# Patient Record
Sex: FEMALE | Race: WHITE | NOT HISPANIC OR LATINO | Employment: FULL TIME | URBAN - METROPOLITAN AREA
[De-identification: names, ages, dates, MRNs, and addresses within clinical notes are randomized per-mention and may not be internally consistent; named-entity substitution may affect disease eponyms.]

---

## 2021-02-17 ENCOUNTER — CONSULT (OUTPATIENT)
Dept: SURGERY | Facility: CLINIC | Age: 41
End: 2021-02-17
Payer: COMMERCIAL

## 2021-02-17 VITALS
BODY MASS INDEX: 35.16 KG/M2 | DIASTOLIC BLOOD PRESSURE: 84 MMHG | HEART RATE: 80 BPM | WEIGHT: 211 LBS | HEIGHT: 65 IN | SYSTOLIC BLOOD PRESSURE: 138 MMHG | TEMPERATURE: 98.8 F

## 2021-02-17 DIAGNOSIS — T81.89XA SUTURE GRANULOMA: Primary | ICD-10-CM

## 2021-02-17 PROCEDURE — 99211 OFF/OP EST MAY X REQ PHY/QHP: CPT | Performed by: SPECIALIST

## 2021-02-17 RX ORDER — BUTALBITAL, ACETAMINOPHEN AND CAFFEINE 50; 325; 40 MG/1; MG/1; MG/1
1 TABLET ORAL EVERY 4 HOURS PRN
COMMUNITY
End: 2022-03-02 | Stop reason: HOSPADM

## 2021-02-17 NOTE — LETTER
February 17, 2021     [address]  [address] [facility]  [place] [address] [address]  [address]    Patient: Emy Marr   YOB: 1980   Date of Visit: 2/17/2021       Dear  Angel Keenan you for referring Emy Marr to me for evaluation  Below are my notes for this consultation  If you have questions, please do not hesitate to call me  I look forward to following your patient along with you  Sincerely,     Olla Mcardle, MD        CC: No Recipients  Reed Weaver MD  2/17/2021  5:43 PM  Sign when Signing Visit  Chief Complaint:  Abdominal pain      History of Present Illness:  Patient is a 59-year-old white female from [facility] who underwent an open vertical banded gastroplasty in the early 2000s for morbid obesity  She developed an incisional hernia and subsequent underwent a laparoscopic repair of her incisional hernia with mesh  She lost a significant amount of weight and developed a large abdominal pannus  She was recently seen in the office of Dr Ana Bob plastic surgeon a [facility] for evaluation for skin removal surgery  Over the past several months she has developed fairly significant left paramedian pain  This was related primarily with movement, physical activity etcetera  There was no GI component to this  She was seen in consultation by her previous surgeons and this was obviously felt to be related to her hernia repair  She had imaging studies that did not demonstrate any abnormalities  She apparently spent a suture from the left side of her abdomen through 1 of the port sites and this was removed in the office by her surgeon  Be that as it may she was seen by Dr Robyn Vail and referred to our office for evaluation in regards to this abdominal pain  Past Medical History: History reviewed  No pertinent past medical history  Morbid obesity      Past Surgical History:  History reviewed  No pertinent surgical history     vertical banded gastroplasty, laparoscopic incisional hernia repair    Allergies:  No Known Allergies      Medications:    Current Outpatient Medications:     butalbital-acetaminophen-caffeine (FIORICET,ESGIC) -40 mg per tablet, Take 1 tablet by mouth every 4 (four) hours as needed for headaches, Disp: , Rfl:       Social History:  Social History     Social History     Substance and Sexual Activity   Alcohol Use Yes    Alcohol/week: 1 0 standard drinks    Types: 1 Glasses of wine per week    Frequency: Monthly or less    Drinks per session: 1 or 2    Binge frequency: Less than monthly     Social History     Substance and Sexual Activity   Drug Use Never     Social History     Tobacco Use   Smoking Status Never Smoker   Smokeless Tobacco Never Used         Family History:  History reviewed  No pertinent family history  Review of Systems:     as per the HPI left-sided abdominal wall pain with exertion physical activity sneezing coughing etcetera  Weight loss as per the HPI she denies fever chills night sweats chest pain nausea vomiting diarrhea constipation shortness of breath headaches blurry vision cough sore throat dysuria hematuria etcetera all other review of systems are negative    Vitals:  Vitals:    02/17/21 1037   BP: 138/84   Pulse: 80   Temp: 98 8 °F (37 1 °C)       Physical Exam:   the patient is a middle-aged white female who is awake alert no distress  She is 5 ft 5 in 211 lb with BMI of 35  Vital signs as above  Skin warm dry  Head normocephalic and atraumatic   Eyes DAPHNIE a  EOM intact  Ears nose within normal limits  Throat gag reflex intact   Neck no masses thyromegaly lymphadenopathy palpable  Back no CVA or spinal tenderness   Lungs clear to a and P   Cor regular rate and rhythm no murmurs carotid bruits   Abdomen soft obese with large pannus noted  Midline incision well healed upper midline  No evidence recurrent hernia  Laparoscopic incisions    She has some tenderness to deep palpation primarily in the left paramedian area  No masses or hernias are noted  Extremities negative CC E   Neurologically A&O x3 cranial nerves 2-12 intact   Lymphatics no lymphadenopathy palpable      Lab Results: I have personally reviewed pertinent reports  See below  Imaging: I have personally reviewed pertinent reports  EKG, Pathology, and Other Studies: I have personally reviewed pertinent reports  No visits with results within 1 Day(s) from this visit  Latest known visit with results is:   No results found for any previous visit  Impression:   left paramedian abdominal wall pain  Her previous surgeon felt that perhaps the tacks that were used to fix her incisional hernia laparoscopically may be the etiology of this  The surgeon said it would be like looking for needle in a haystack  The hernia repair took place around 2016 2017  Most of the tacks used at that time were absorbable tacks and not titanium  In reviewing the imaging studies no comment was made if tacks were visible  In reviewing the operative report of the laparoscopic hernia repair the term tack was used but not whether they are absorbable  Or permanent  Incidentally sutures were placed to fix the mesh transabdominally and trans fascial E  These could be the culprits  3 approaches were discussed with the patient and her significant other  1  The  Area in question could be injected with Marcaine and steroids to see if that would help ease the situation  If this afforded significant long-term relief than the problem solved  2  Cutting down on the area and trying to remove what might be causing the problem 3  If permanent tacks were used that perhaps a laparoscopic approach might be indicated  In an attempt to remove them     Another approach would be to be present for the abdominal plasty and then inspect the abdominal wall and fascia at that time    If sutures would be present these could be removed at that time hopefully solving the problem  Plan: At this point will attempt to get the CD of the CT scan to see if any tacks were visible since none of this was mentioned on any of the imaging reports  Be available for the procedure to see if any suture granulomas could be removed at the time of surgery  If no permanent tacks were involved then it would have to be the sutures causing the problem

## 2021-02-17 NOTE — PROGRESS NOTES
Chief Complaint:  Abdominal pain      History of Present Illness:  Patient is a 77-year-old [facility] who underwent an open vertical banded gastroplasty in the early 2000s for morbid obesity  She developed an incisional hernia and subsequent underwent a laparoscopic repair of her incisional hernia with mesh  She lost a significant amount of weight and developed a large abdominal pannus  She was recently seen in the office of Dr Estevan Johnston plastic surgeon a [facility] for evaluation for skin removal surgery  Over the past several months she has developed fairly significant left paramedian pain  This was related primarily with movement, physical activity etcetera  There was no GI component to this  She was seen in consultation by her previous surgeons and this was obviously felt to be related to her hernia repair  She had imaging studies that did not demonstrate any abnormalities  She apparently spent a suture from the left side of her abdomen through 1 of the port sites and this was removed in the office by her surgeon  Be that as it may she was seen by Dr Tonia Pierce and referred to our office for evaluation in regards to this abdominal pain  Past Medical History: History reviewed  No pertinent past medical history  Morbid obesity      Past Surgical History:  History reviewed  No pertinent surgical history     vertical banded gastroplasty, laparoscopic incisional hernia repair    Allergies:  No Known Allergies      Medications:    Current Outpatient Medications:     butalbital-acetaminophen-caffeine (FIORICET,ESGIC) -40 mg per tablet, Take 1 tablet by mouth every 4 (four) hours as needed for headaches, Disp: , Rfl:       Social History:  Social History     Social History     Substance and Sexual Activity   Alcohol Use Yes    Alcohol/week: 1 0 standard drinks    Types: 1 Glasses of wine per week    Frequency: Monthly or less    Drinks per session: 1 or 2  Binge frequency: Less than monthly     Social History     Substance and Sexual Activity   Drug Use Never     Social History     Tobacco Use   Smoking Status Never Smoker   Smokeless Tobacco Never Used         Family History:  History reviewed  No pertinent family history  Review of Systems:     as per the HPI left-sided abdominal wall pain with exertion physical activity sneezing coughing etcetera  Weight loss as per the HPI she denies fever chills night sweats chest pain nausea vomiting diarrhea constipation shortness of breath headaches blurry vision cough sore throat dysuria hematuria etcetera all other review of systems are negative    Vitals:  Vitals:    02/17/21 1037   BP: 138/84   Pulse: 80   Temp: 98 8 °F (37 1 °C)       Physical Exam:   the patient is a middle-aged white female who is awake alert no distress  She is 5 ft 5 in 211 lb with BMI of 35  Vital signs as above  Skin warm dry  Head normocephalic and atraumatic   Eyes DAPHNIE a  EOM intact  Ears nose within normal limits  Throat gag reflex intact   Neck no masses thyromegaly lymphadenopathy palpable  Back no CVA or spinal tenderness   Lungs clear to a and P   Cor regular rate and rhythm no murmurs carotid bruits   Abdomen soft obese with large pannus noted  Midline incision well healed upper midline  No evidence recurrent hernia  Laparoscopic incisions  She has some tenderness to deep palpation primarily in the left paramedian area  No masses or hernias are noted  Extremities negative CC E   Neurologically A&O x3 cranial nerves 2-12 intact   Lymphatics no lymphadenopathy palpable      Lab Results: I have personally reviewed pertinent reports  See below  Imaging: I have personally reviewed pertinent reports  EKG, Pathology, and Other Studies: I have personally reviewed pertinent reports  No visits with results within 1 Day(s) from this visit  Latest known visit with results is:   No results found for any previous visit  Impression:   left paramedian abdominal wall pain  Her previous surgeon felt that perhaps the tacks that were used to fix her incisional hernia laparoscopically may be the etiology of this  The surgeon said it would be like looking for needle in a haystack  The hernia repair took place around 2016 2017  Most of the tacks used at that time were absorbable tacks and not titanium  In reviewing the imaging studies no comment was made if tacks were visible  In reviewing the operative report of the laparoscopic hernia repair the term tack was used but not whether they are absorbable  Or permanent  Incidentally sutures were placed to fix the mesh transabdominally and trans fascial E  These could be the culprits  3 approaches were discussed with the patient and her significant other  1  The  Area in question could be injected with Marcaine and steroids to see if that would help ease the situation  If this afforded significant long-term relief than the problem solved  2  Cutting down on the area and trying to remove what might be causing the problem 3  If permanent tacks were used that perhaps a laparoscopic approach might be indicated  In an attempt to remove them     Another approach would be to be present for the abdominal plasty and then inspect the abdominal wall and fascia at that time  If sutures would be present these could be removed at that time hopefully solving the problem  Plan: At this point will attempt to get the CD of the CT scan to see if any tacks were visible since none of this was mentioned on any of the imaging reports  Be available for the procedure to see if any suture granulomas could be removed at the time of surgery  If no permanent tacks were involved then it would have to be the sutures causing the problem

## 2021-02-23 ENCOUNTER — TELEPHONE (OUTPATIENT)
Dept: SURGERY | Facility: CLINIC | Age: 41
End: 2021-02-23

## 2021-05-12 ENCOUNTER — APPOINTMENT (OUTPATIENT)
Dept: RADIOLOGY | Facility: CLINIC | Age: 41
End: 2021-05-12
Payer: COMMERCIAL

## 2021-05-12 ENCOUNTER — OFFICE VISIT (OUTPATIENT)
Dept: URGENT CARE | Facility: CLINIC | Age: 41
End: 2021-05-12
Payer: COMMERCIAL

## 2021-05-12 VITALS
SYSTOLIC BLOOD PRESSURE: 133 MMHG | BODY MASS INDEX: 35.16 KG/M2 | RESPIRATION RATE: 18 BRPM | WEIGHT: 211 LBS | TEMPERATURE: 98 F | HEART RATE: 89 BPM | OXYGEN SATURATION: 98 % | DIASTOLIC BLOOD PRESSURE: 63 MMHG | HEIGHT: 65 IN

## 2021-05-12 DIAGNOSIS — S52.502A CLOSED FRACTURE OF DISTAL END OF LEFT RADIUS, UNSPECIFIED FRACTURE MORPHOLOGY, INITIAL ENCOUNTER: Primary | ICD-10-CM

## 2021-05-12 DIAGNOSIS — S69.92XA INJURY OF LEFT WRIST, INITIAL ENCOUNTER: ICD-10-CM

## 2021-05-12 PROCEDURE — 99213 OFFICE O/P EST LOW 20 MIN: CPT | Performed by: PHYSICIAN ASSISTANT

## 2021-05-12 PROCEDURE — 73110 X-RAY EXAM OF WRIST: CPT

## 2021-05-12 NOTE — PROGRESS NOTES
[facility]    NAME: Isra Loredo is a 39 y o  female  : 1980    MRN: [id number]  DATE: May 12, 2021  TIME: 7:19 PM    Assessment and Plan   Closed fracture of distal end of left radius, unspecified fracture morphology, initial encounter [S52 502A]  1  Closed fracture of distal end of left radius, unspecified fracture morphology, initial encounter  Ambulatory referral to Orthopedic Surgery   2  Injury of left wrist, initial encounter  XR wrist 3+ vw left       Patient Instructions   Patient Instructions   Wear brace  Follow up with ortho  Ice, elevation  Chief Complaint     Chief Complaint   Patient presents with    Wrist Pain     left wrist pain after an 80 lb bag of concrete mix fell onto her wrist 2 days ago       History of Present Illness     41-year-old female here with complaint of left wrist pain  Patient was moving 80 pound bags of concrete and a bag fell onto her wrist   This occurred 3 days ago  Wrist is still swollen and painful  Review of Systems   Review of Systems   Constitutional: Negative for chills and fever  Respiratory: Negative for cough and shortness of breath  Cardiovascular: Negative for chest pain     Musculoskeletal:        Left wrist injury, see HPI       Current Medications     Current Outpatient Medications:     butalbital-acetaminophen-caffeine (FIORICET,ESGIC) -40 mg per tablet, Take 1 tablet by mouth every 4 (four) hours as needed for headaches, Disp: , Rfl:     levonorgestrel (Mirena, 52 MG,) 20 MCG/24HR IUD, , Disp: , Rfl:     Current Allergies     Allergies as of 2021 - Reviewed 2021   Allergen Reaction Noted    Fentanyl Itching 2020    Hydromorphone Rash 2020    Morphine Itching 2020          The following portions of the patient's history were reviewed and updated as appropriate: allergies, current medications, past family history, past medical history, past social history, past surgical history and problem list    History reviewed  No pertinent past medical history  History reviewed  No pertinent surgical history  History reviewed  No pertinent family history  Social History     Socioeconomic History    Marital status:      Spouse name: Not on file    Number of children: Not on file    Years of education: Not on file    Highest education level: Not on file   Occupational History    Not on file   Social Needs    Financial resource strain: Not on file    Food insecurity     Worry: Not on file     Inability: Not on file    Transportation needs     Medical: Not on file     Non-medical: Not on file   Tobacco Use    Smoking status: Never Smoker    Smokeless tobacco: Never Used   Substance and Sexual Activity    Alcohol use: Yes     Alcohol/week: 1 0 standard drinks     Types: 1 Glasses of wine per week     Frequency: Monthly or less     Drinks per session: 1 or 2     Binge frequency: Less than monthly    Drug use: Never    Sexual activity: Yes     Partners: Male   Lifestyle    Physical activity     Days per week: Not on file     Minutes per session: Not on file    Stress: Not on file   Relationships    Social connections     Talks on phone: Not on file     Gets together: Not on file     Attends Synagogue service: Not on file     Active member of club or organization: Not on file     Attends meetings of clubs or organizations: Not on file     Relationship status: Not on file    Intimate partner violence     Fear of current or ex partner: Not on file     Emotionally abused: Not on file     Physically abused: Not on file     Forced sexual activity: Not on file   Other Topics Concern    Not on file   Social History Narrative    Not on file     Medications have been verified      Objective   /63   Pulse 89   Temp 98 °F (36 7 °C) (Temporal)   Resp 18   Ht 5' 5" (1 651 m)   Wt 95 7 kg (211 lb)   SpO2 98%   BMI 35 11 kg/m²      Physical Exam   Physical Exam  Vitals signs and nursing note reviewed  Constitutional:       Appearance: Normal appearance  Neck:      Musculoskeletal: Normal range of motion  Cardiovascular:      Rate and Rhythm: Normal rate and regular rhythm  Pulses: Normal pulses  Heart sounds: Normal heart sounds  Pulmonary:      Effort: Pulmonary effort is normal       Breath sounds: Normal breath sounds  Musculoskeletal:      Left wrist: She exhibits decreased range of motion, tenderness, bony tenderness and swelling  Neurological:      Mental Status: She is alert

## 2021-05-21 ENCOUNTER — OFFICE VISIT (OUTPATIENT)
Dept: OBGYN CLINIC | Facility: CLINIC | Age: 41
End: 2021-05-21
Payer: COMMERCIAL

## 2021-05-21 VITALS
HEIGHT: 65 IN | HEART RATE: 88 BPM | SYSTOLIC BLOOD PRESSURE: 132 MMHG | DIASTOLIC BLOOD PRESSURE: 84 MMHG | WEIGHT: 209 LBS | BODY MASS INDEX: 34.82 KG/M2

## 2021-05-21 DIAGNOSIS — S69.92XA WRIST INJURY, LEFT, INITIAL ENCOUNTER: Primary | ICD-10-CM

## 2021-05-21 DIAGNOSIS — S60.212A CONTUSION OF LEFT WRIST, INITIAL ENCOUNTER: ICD-10-CM

## 2021-05-21 PROCEDURE — 99204 OFFICE O/P NEW MOD 45 MIN: CPT | Performed by: FAMILY MEDICINE

## 2021-05-21 RX ORDER — MELOXICAM 7.5 MG/1
TABLET ORAL
COMMUNITY
Start: 2021-05-18 | End: 2022-03-02 | Stop reason: HOSPADM

## 2021-05-21 NOTE — PROGRESS NOTES
[facility] [place]  [address]  [facility] [address] [phone]      Chief Complaint:  Chief Complaint   Patient presents with    Left Wrist - Pain, Swelling       Vitals:  /84 (BP Location: Left arm, Patient Position: Sitting, Cuff Size: Large)   Pulse 88   Ht 5' 5" (1 651 m)   Wt 94 8 kg (209 lb)   BMI 34 78 kg/m²     The following portions of the patient's history were reviewed and updated as appropriate: allergies, current medications, past family history, past medical history, past social history, past surgical history, and problem list       Subjective:   Patient ID: Alexus Haji is a 39 y o  female  Here c/o L wrist pain  11 days ago lifting 80 lb bag of cement and dropped on wrist on trailer  Wearing wrist brace  Seen in [facility]- note reviewed  XR done  Hurts to twist wrist/extend/bend wrist   Sharp pain  Taking ibuprofen- not much help    LEFT WRIST     INDICATION:   S69  92XA: Unspecified injury of left wrist, hand and finger(s), initial encounter  Ulnar pain     COMPARISON:  None     VIEWS:  XR WRIST 3+ VW LEFT         FINDINGS:     There is no acute fracture or dislocation      No significant degenerative changes      No lytic or blastic osseous lesion      Soft tissues are unremarkable      IMPRESSION:     No acute osseous abnormality      Review of Systems   Constitutional: Negative for fatigue and fever  Respiratory: Negative for cough and shortness of breath  Cardiovascular: Negative for chest pain  Gastrointestinal: Negative for abdominal pain and nausea  Musculoskeletal: Positive for arthralgias  Skin: Negative for rash and wound  Neurological: Negative for weakness and headaches  Objective:  Ortho Exam    Physical Exam  Vitals signs and nursing note reviewed  Constitutional:       Appearance: Normal appearance  She is well-developed  HENT:      Head: Normocephalic        Mouth/Throat: Mouth: Mucous membranes are moist    Eyes:      Extraocular Movements: Extraocular movements intact  Neck:      Musculoskeletal: Normal range of motion  Cardiovascular:      Rate and Rhythm: Normal rate and regular rhythm  Heart sounds: Normal heart sounds  Pulmonary:      Effort: Pulmonary effort is normal       Breath sounds: Normal breath sounds  Abdominal:      General: Bowel sounds are normal       Palpations: Abdomen is soft  Musculoskeletal: Normal range of motion  General: Tenderness present  Comments: L distal ulna TTP,  Pain with supination  Pain with resistance to supination pronation   Skin:     General: Skin is warm and dry  Neurological:      General: No focal deficit present  Mental Status: She is alert and oriented to person, place, and time  Psychiatric:         Mood and Affect: Mood normal          Behavior: Behavior normal          Thought Content: Thought content normal          I have personally reviewed pertinent films in PACS and my interpretation is XR- L wrist- no fx  Assessment/Plan:  Assessment/Plan   Diagnoses and all orders for this visit:    Wrist injury, left, initial encounter    Contusion of left wrist, initial encounter    Other orders  -     meloxicam (MOBIC) 7 5 mg tablet        Return in about 11 days (around 6/1/2021) for Recheck       Armaan Sampson MD

## 2021-05-21 NOTE — PATIENT INSTRUCTIONS
F/u 6/1/21  Icing/OTC pain meds as needed  Wrist brace as needed  Most likely contusion of muscles/tendons/bone  Range of motion exericises  4x daily as tolerated

## 2021-07-21 ENCOUNTER — OFFICE VISIT (OUTPATIENT)
Dept: URGENT CARE | Facility: CLINIC | Age: 41
End: 2021-07-21
Payer: COMMERCIAL

## 2021-07-21 VITALS
DIASTOLIC BLOOD PRESSURE: 63 MMHG | BODY MASS INDEX: 34.83 KG/M2 | SYSTOLIC BLOOD PRESSURE: 125 MMHG | HEART RATE: 84 BPM | HEIGHT: 64 IN | TEMPERATURE: 98.2 F | OXYGEN SATURATION: 100 % | RESPIRATION RATE: 18 BRPM | WEIGHT: 204 LBS

## 2021-07-21 DIAGNOSIS — J01.10 ACUTE NON-RECURRENT FRONTAL SINUSITIS: ICD-10-CM

## 2021-07-21 DIAGNOSIS — H66.004 RECURRENT ACUTE SUPPURATIVE OTITIS MEDIA OF RIGHT EAR WITHOUT SPONTANEOUS RUPTURE OF TYMPANIC MEMBRANE: Primary | ICD-10-CM

## 2021-07-21 PROCEDURE — 99214 OFFICE O/P EST MOD 30 MIN: CPT | Performed by: PHYSICIAN ASSISTANT

## 2021-07-21 RX ORDER — AMOXICILLIN AND CLAVULANATE POTASSIUM 400; 57 MG/5ML; MG/5ML
POWDER, FOR SUSPENSION ORAL
Qty: 84 ML | Refills: 0 | Status: SHIPPED | OUTPATIENT
Start: 2021-07-21 | End: 2021-07-23 | Stop reason: SDUPTHER

## 2021-07-21 NOTE — LETTER
July 21, 2021     Patient: Andrew Ayoub   YOB: 1980   Date of Visit: 7/21/2021       To Whom it May Concern:    Andrew Ayoub was seen in my clinic on 7/21/2021  She may return to work on 07/23/2021  If you have any questions or concerns, please don't hesitate to call           Sincerely,          Susan Thornton PA-C        CC: Andrew Ayoub

## 2021-07-21 NOTE — PROGRESS NOTES
[facility]        NAME: Gretta Chavarria is a 39 y o  female  : 1980    MRN: [id number]  DATE: 2021  TIME: 6:59 PM    Assessment and Plan   Recurrent acute suppurative otitis media of right ear without spontaneous rupture of tympanic membrane [H66 [id number]]  1  Recurrent acute suppurative otitis media of right ear without spontaneous rupture of tympanic membrane  amoxicillin-clavulanate (AUGMENTIN) 400-57 mg/5 mL suspension   2  Acute non-recurrent frontal sinusitis           Patient Instructions   Patient Instructions   Otitis Media, Ambulatory Care   GENERAL INFORMATION:   Otitis media  is an ear infection  Common symptoms include the following:   · Fever or a headache    · Ear pain    · Trouble hearing    · Ear feels plugged or full or you have ringing or buzzing in your ear    · Dizziness or you lose your balance    · Nausea or vomiting  Seek immediate care for the following symptoms:   · Seizure    · Fever and a stiff neck  Treatment for otitis media  may include any of the following:  · NSAIDs  help decrease swelling and pain or fever  This medicine is available with or without a doctor's order  NSAIDs can cause stomach bleeding or kidney problems in certain people  If you take blood thinner medicine, always ask your healthcare provider if NSAIDs are safe for you  Always read the medicine label and follow directions  · Ear drops  to help treat your ear pain  · Antibiotics  to help kill the germs that caused your ear infection  Care for otitis media:   · Use heat  Place a warm, moist washcloth on your ear to decrease pain  Apply for 15 to 20 minutes, 3 to 4 times a day    · Use ice  Ice helps decrease swelling and pain  Use an ice pack or put crushed ice in a plastic bag  Cover the ice pack with a towel and place it on your ear for 15 to 20 minutes, 3 to 4 times a day for 2 days  Prevent otitis media:   · Wash your hands often  This will help prevent the spread of germs  Encourage everyone in your house to wash their hands with soap and water after they use the bathroom  Everyone should also wash their hands after they change a child's diaper and before they prepare or eat food  · Stay away from people who are ill  Germs are easily and quickly spread through contact  Follow up with your healthcare provider as directed:  Write down your questions so you remember to ask them during your visits  CARE AGREEMENT:   You have the right to help plan your care  Learn about your health condition and how it may be treated  Discuss treatment options with your caregivers to decide what care you want to receive  You always have the right to refuse treatment  The above information is an  only  It is not intended as medical advice for individual conditions or treatments  Talk to your doctor, nurse or pharmacist before following any medical regimen to see if it is safe and effective for you  © 2014 [address] is for End User's use only and may not be sold, redistributed or otherwise used for commercial purposes  All illustrations and images included in CareNotes® are the copyrighted property of [employer] M [employer]  or [employer]  Follow up with PCP in 3-5 days  Proceed to  ER if symptoms worsen  Chief Complaint     Chief Complaint   Patient presents with    Earache     bilateral ear pain, fatigue, post nasal drip, headache for 2 days         History of Present Illness       -The patient c/o HA, ear pain, fatigue x 2 days  -She denies fever, chills, loss of taste or loss of smell  -She also c/o non-productive cough but denies SOB  -She has a h/o Migraine HA and is out of [facility]  -Did not have COVID vaccine  -No sick contacts    -She denies drainage from ear  -She is prone to getting ear infections      Review of Systems   Review of Systems   Constitutional: Positive for fatigue  Negative for chills and fever     HENT: Positive for congestion, ear discharge, postnasal drip, sinus pain and sore throat  Negative for ear pain  Eyes: Negative for pain and visual disturbance  Respiratory: Positive for cough  Negative for shortness of breath  Cardiovascular: Negative for chest pain and palpitations  Gastrointestinal: Negative for abdominal pain, diarrhea and vomiting  Genitourinary: Negative for dysuria and hematuria  Musculoskeletal: Negative for arthralgias and back pain  Skin: Negative for color change and rash  Neurological: Positive for dizziness and headaches  Negative for seizures and syncope  All other systems reviewed and are negative  Current Medications       Current Outpatient Medications:     butalbital-acetaminophen-caffeine (FIORICET,ESGIC) -40 mg per tablet, Take 1 tablet by mouth every 4 (four) hours as needed for headaches, Disp: , Rfl:     levonorgestrel (Mirena, 52 MG,) 20 MCG/24HR IUD, , Disp: , Rfl:     amoxicillin-clavulanate (AUGMENTIN) 400-57 mg/5 mL suspension, 6 ml BID for 7 days, Disp: 84 mL, Rfl: 0    meloxicam (MOBIC) 7 5 mg tablet, , Disp: , Rfl:     Current Allergies     Allergies as of 07/21/2021 - Reviewed 07/21/2021   Allergen Reaction Noted    Fentanyl Itching 11/17/2020    Hydromorphone Rash 11/19/2020    Morphine Itching 11/17/2020            The following portions of the patient's history were reviewed and updated as appropriate: allergies, current medications, past family history, past medical history, past social history, past surgical history and problem list      History reviewed  No pertinent past medical history  History reviewed  No pertinent surgical history  History reviewed  No pertinent family history  Medications have been verified          Objective   /63   Pulse 84   Temp 98 2 °F (36 8 °C) (Temporal)   Resp 18   Ht 5' 4" (1 626 m)   Wt 92 5 kg (204 lb)   SpO2 100%   BMI 35 02 kg/m²        Physical Exam     Physical Exam  Constitutional:       Appearance: She is well-developed  She is not diaphoretic  HENT:      Head: Normocephalic  Right Ear: Tympanic membrane is injected and erythematous  Nose: Mucosal edema present  Right Turbinates: Swollen  Left Turbinates: Enlarged  Right Sinus: Frontal sinus tenderness present  Left Sinus: Frontal sinus tenderness present  Eyes:      General:         Right eye: No discharge  Left eye: No discharge  Pupils: Pupils are equal, round, and reactive to light  Neck:      Thyroid: No thyromegaly  Cardiovascular:      Rate and Rhythm: Normal rate  Heart sounds: No murmur heard  Pulmonary:      Effort: Pulmonary effort is normal  No respiratory distress  Breath sounds: No wheezing or rales  Chest:      Chest wall: No tenderness  Abdominal:      General: There is no distension  Palpations: Abdomen is soft  Tenderness: There is no abdominal tenderness  There is no guarding or rebound  Musculoskeletal:         General: Normal range of motion  Cervical back: Normal range of motion  Lymphadenopathy:      Cervical: Cervical adenopathy present  Skin:     General: Skin is warm  Neurological:      Mental Status: She is alert and oriented to person, place, and time

## 2021-07-21 NOTE — PATIENT INSTRUCTIONS
Otitis Media, Ambulatory Care   GENERAL INFORMATION:   Otitis media  is an ear infection  Common symptoms include the following:   · Fever or a headache    · Ear pain    · Trouble hearing    · Ear feels plugged or full or you have ringing or buzzing in your ear    · Dizziness or you lose your balance    · Nausea or vomiting  Seek immediate care for the following symptoms:   · Seizure    · Fever and a stiff neck  Treatment for otitis media  may include any of the following:  · NSAIDs  help decrease swelling and pain or fever  This medicine is available with or without a doctor's order  NSAIDs can cause stomach bleeding or kidney problems in certain people  If you take blood thinner medicine, always ask your healthcare provider if NSAIDs are safe for you  Always read the medicine label and follow directions  · Ear drops  to help treat your ear pain  · Antibiotics  to help kill the germs that caused your ear infection  Care for otitis media:   · Use heat  Place a warm, moist washcloth on your ear to decrease pain  Apply for 15 to 20 minutes, 3 to 4 times a day    · Use ice  Ice helps decrease swelling and pain  Use an ice pack or put crushed ice in a plastic bag  Cover the ice pack with a towel and place it on your ear for 15 to 20 minutes, 3 to 4 times a day for 2 days  Prevent otitis media:   · Wash your hands often  This will help prevent the spread of germs  Encourage everyone in your house to wash their hands with soap and water after they use the bathroom  Everyone should also wash their hands after they change a child's diaper and before they prepare or eat food  · Stay away from people who are ill  Germs are easily and quickly spread through contact  Follow up with your healthcare provider as directed:  Write down your questions so you remember to ask them during your visits  CARE AGREEMENT:   You have the right to help plan your care   Learn about your health condition and how it may be treated  Discuss treatment options with your caregivers to decide what care you want to receive  You always have the right to refuse treatment  The above information is an  only  It is not intended as medical advice for individual conditions or treatments  Talk to your doctor, nurse or pharmacist before following any medical regimen to see if it is safe and effective for you  © 2014 [address] is for End User's use only and may not be sold, redistributed or otherwise used for commercial purposes  All illustrations and images included in CareNotes® are the copyrighted property of A D A M , Inc  or [employer] Curtis

## 2021-07-21 NOTE — LETTER
July 23, 2021     Patient: Glen Cantor   YOB: 1980   Date of Visit: 7/21/2021       To Whom it May Concern:    Glen Cantor was seen in my clinic on 7/21/2021  She may return to work on 07/26/2021  If you have any questions or concerns, please don't hesitate to call           Sincerely,          Yaneli Clarke PA-C        CC: Glen Cantor

## 2021-07-23 RX ORDER — AMOXICILLIN AND CLAVULANATE POTASSIUM 400; 57 MG/5ML; MG/5ML
POWDER, FOR SUSPENSION ORAL
Qty: 84 ML | Refills: 0 | Status: SHIPPED | OUTPATIENT
Start: 2021-07-23 | End: 2021-07-30

## 2022-01-01 ENCOUNTER — HOSPITAL ENCOUNTER (EMERGENCY)
Facility: HOSPITAL | Age: 42
Discharge: HOME | End: 2022-01-01
Attending: INTERNAL MEDICINE
Payer: COMMERCIAL

## 2022-01-01 ENCOUNTER — APPOINTMENT (EMERGENCY)
Dept: CT IMAGING | Facility: HOSPITAL | Age: 42
End: 2022-01-01
Payer: COMMERCIAL

## 2022-01-01 VITALS
HEART RATE: 81 BPM | BODY MASS INDEX: 35 KG/M2 | RESPIRATION RATE: 20 BRPM | OXYGEN SATURATION: 100 % | DIASTOLIC BLOOD PRESSURE: 89 MMHG | WEIGHT: 205 LBS | SYSTOLIC BLOOD PRESSURE: 155 MMHG | TEMPERATURE: 98.5 F | HEIGHT: 64 IN

## 2022-01-01 DIAGNOSIS — J32.9 SINUSITIS: ICD-10-CM

## 2022-01-01 DIAGNOSIS — G43.909 MIGRAINE: ICD-10-CM

## 2022-01-01 DIAGNOSIS — G51.0 BELL'S PALSY: ICD-10-CM

## 2022-01-01 DIAGNOSIS — D32.9 MENINGIOMA (HCC): Primary | ICD-10-CM

## 2022-01-01 LAB
ALBUMIN SERPL BCP-MCNC: 4 G/DL (ref 3.5–5)
ALP SERPL-CCNC: 67 U/L (ref 34–104)
ALT SERPL W P-5'-P-CCNC: 23 U/L (ref 7–52)
ANION GAP SERPL CALCULATED.3IONS-SCNC: 6 MMOL/L (ref 4–13)
AST SERPL W P-5'-P-CCNC: 24 U/L (ref 13–39)
BACTERIA UR QL AUTO: ABNORMAL /HPF
BASOPHILS # BLD AUTO: 0.02 THOUSANDS/ΜL (ref 0–0.1)
BASOPHILS NFR BLD AUTO: 0 % (ref 0–1)
BILIRUB DIRECT SERPL-MCNC: 0.04 MG/DL (ref 0–0.2)
BILIRUB SERPL-MCNC: 0.28 MG/DL (ref 0.2–1)
BILIRUB UR QL STRIP: NEGATIVE
BUN SERPL-MCNC: 14 MG/DL (ref 5–25)
CALCIUM SERPL-MCNC: 8.6 MG/DL (ref 8.4–10.2)
CHLORIDE SERPL-SCNC: 105 MMOL/L (ref 96–108)
CLARITY UR: CLEAR
CO2 SERPL-SCNC: 27 MMOL/L (ref 21–32)
COLOR UR: YELLOW
CREAT SERPL-MCNC: 0.79 MG/DL (ref 0.6–1.3)
EOSINOPHIL # BLD AUTO: 0.19 THOUSAND/ΜL (ref 0–0.61)
EOSINOPHIL NFR BLD AUTO: 2 % (ref 0–6)
ERYTHROCYTE [DISTWIDTH] IN BLOOD BY AUTOMATED COUNT: 13.3 % (ref 11.6–15.1)
EXT PREG TEST URINE: NEGATIVE
EXT. CONTROL ED NAV: NORMAL
GFR SERPL CREATININE-BSD FRML MDRD: 93 ML/MIN/1.73SQ M
GLUCOSE SERPL-MCNC: 88 MG/DL (ref 65–140)
GLUCOSE UR STRIP-MCNC: NEGATIVE MG/DL
HCT VFR BLD AUTO: 41.9 % (ref 34.8–46.1)
HGB BLD-MCNC: 13.9 G/DL (ref 11.5–15.4)
HGB UR QL STRIP.AUTO: ABNORMAL
IMM GRANULOCYTES # BLD AUTO: 0.02 THOUSAND/UL (ref 0–0.2)
IMM GRANULOCYTES NFR BLD AUTO: 0 % (ref 0–2)
KETONES UR STRIP-MCNC: ABNORMAL MG/DL
LEUKOCYTE ESTERASE UR QL STRIP: NEGATIVE
LIPASE SERPL-CCNC: 55 U/L (ref 11–82)
LYMPHOCYTES # BLD AUTO: 3.34 THOUSANDS/ΜL (ref 0.6–4.47)
LYMPHOCYTES NFR BLD AUTO: 41 % (ref 14–44)
MAGNESIUM SERPL-MCNC: 1.9 MG/DL (ref 1.9–2.7)
MCH RBC QN AUTO: 30.5 PG (ref 26.8–34.3)
MCHC RBC AUTO-ENTMCNC: 33.2 G/DL (ref 31.4–37.4)
MCV RBC AUTO: 92 FL (ref 82–98)
MONOCYTES # BLD AUTO: 0.77 THOUSAND/ΜL (ref 0.17–1.22)
MONOCYTES NFR BLD AUTO: 10 % (ref 4–12)
MUCOUS THREADS UR QL AUTO: ABNORMAL
NEUTROPHILS # BLD AUTO: 3.8 THOUSANDS/ΜL (ref 1.85–7.62)
NEUTS SEG NFR BLD AUTO: 47 % (ref 43–75)
NITRITE UR QL STRIP: NEGATIVE
NON-SQ EPI CELLS URNS QL MICRO: ABNORMAL /HPF
NRBC BLD AUTO-RTO: 0 /100 WBCS
PH UR STRIP.AUTO: 6.5 [PH]
PLATELET # BLD AUTO: 305 THOUSANDS/UL (ref 149–390)
PMV BLD AUTO: 10.7 FL (ref 8.9–12.7)
POTASSIUM SERPL-SCNC: 3.6 MMOL/L (ref 3.5–5.3)
PROT SERPL-MCNC: 7 G/DL (ref 6.4–8.4)
PROT UR STRIP-MCNC: NEGATIVE MG/DL
RBC # BLD AUTO: 4.56 MILLION/UL (ref 3.81–5.12)
RBC #/AREA URNS AUTO: ABNORMAL /HPF
SODIUM SERPL-SCNC: 138 MMOL/L (ref 135–147)
SP GR UR STRIP.AUTO: 1.02 (ref 1–1.03)
UROBILINOGEN UR QL STRIP.AUTO: 0.2 E.U./DL
WBC # BLD AUTO: 8.14 THOUSAND/UL (ref 4.31–10.16)
WBC #/AREA URNS AUTO: ABNORMAL /HPF

## 2022-01-01 PROCEDURE — 36415 COLL VENOUS BLD VENIPUNCTURE: CPT | Performed by: PHYSICIAN ASSISTANT

## 2022-01-01 PROCEDURE — 96375 TX/PRO/DX INJ NEW DRUG ADDON: CPT

## 2022-01-01 PROCEDURE — 93005 ELECTROCARDIOGRAM TRACING: CPT

## 2022-01-01 PROCEDURE — 99284 EMERGENCY DEPT VISIT MOD MDM: CPT

## 2022-01-01 PROCEDURE — 80048 BASIC METABOLIC PNL TOTAL CA: CPT | Performed by: PHYSICIAN ASSISTANT

## 2022-01-01 PROCEDURE — 99285 EMERGENCY DEPT VISIT HI MDM: CPT | Performed by: PHYSICIAN ASSISTANT

## 2022-01-01 PROCEDURE — 96374 THER/PROPH/DIAG INJ IV PUSH: CPT

## 2022-01-01 PROCEDURE — 80076 HEPATIC FUNCTION PANEL: CPT | Performed by: PHYSICIAN ASSISTANT

## 2022-01-01 PROCEDURE — 81001 URINALYSIS AUTO W/SCOPE: CPT | Performed by: PHYSICIAN ASSISTANT

## 2022-01-01 PROCEDURE — 86618 LYME DISEASE ANTIBODY: CPT | Performed by: PHYSICIAN ASSISTANT

## 2022-01-01 PROCEDURE — 83690 ASSAY OF LIPASE: CPT | Performed by: PHYSICIAN ASSISTANT

## 2022-01-01 PROCEDURE — 85025 COMPLETE CBC W/AUTO DIFF WBC: CPT | Performed by: PHYSICIAN ASSISTANT

## 2022-01-01 PROCEDURE — 70450 CT HEAD/BRAIN W/O DYE: CPT

## 2022-01-01 PROCEDURE — 81025 URINE PREGNANCY TEST: CPT | Performed by: PHYSICIAN ASSISTANT

## 2022-01-01 PROCEDURE — 96361 HYDRATE IV INFUSION ADD-ON: CPT

## 2022-01-01 PROCEDURE — 83735 ASSAY OF MAGNESIUM: CPT | Performed by: PHYSICIAN ASSISTANT

## 2022-01-01 PROCEDURE — 81003 URINALYSIS AUTO W/O SCOPE: CPT | Performed by: PHYSICIAN ASSISTANT

## 2022-01-01 RX ORDER — PREDNISONE 20 MG/1
60 TABLET ORAL DAILY
Qty: 21 TABLET | Refills: 0 | Status: SHIPPED | OUTPATIENT
Start: 2022-01-01 | End: 2022-01-08

## 2022-01-01 RX ORDER — METOCLOPRAMIDE HYDROCHLORIDE 5 MG/ML
10 INJECTION INTRAMUSCULAR; INTRAVENOUS ONCE
Status: COMPLETED | OUTPATIENT
Start: 2022-01-01 | End: 2022-01-01

## 2022-01-01 RX ORDER — FLUTICASONE PROPIONATE 50 MCG
1 SPRAY, SUSPENSION (ML) NASAL DAILY
Qty: 16 G | Refills: 0 | Status: SHIPPED | OUTPATIENT
Start: 2022-01-01 | End: 2022-03-02 | Stop reason: HOSPADM

## 2022-01-01 RX ORDER — KETOROLAC TROMETHAMINE 30 MG/ML
15 INJECTION, SOLUTION INTRAMUSCULAR; INTRAVENOUS ONCE
Status: COMPLETED | OUTPATIENT
Start: 2022-01-01 | End: 2022-01-01

## 2022-01-01 RX ORDER — DIPHENHYDRAMINE HYDROCHLORIDE 50 MG/ML
25 INJECTION INTRAMUSCULAR; INTRAVENOUS ONCE
Status: COMPLETED | OUTPATIENT
Start: 2022-01-01 | End: 2022-01-01

## 2022-01-01 RX ORDER — ACETAMINOPHEN 325 MG/1
650 TABLET ORAL ONCE
Status: COMPLETED | OUTPATIENT
Start: 2022-01-01 | End: 2022-01-01

## 2022-01-01 RX ADMIN — ACETAMINOPHEN 650 MG: 325 TABLET ORAL at 21:02

## 2022-01-01 RX ADMIN — KETOROLAC TROMETHAMINE 15 MG: 30 INJECTION, SOLUTION INTRAMUSCULAR; INTRAVENOUS at 21:03

## 2022-01-01 RX ADMIN — METOCLOPRAMIDE 10 MG: 5 INJECTION, SOLUTION INTRAMUSCULAR; INTRAVENOUS at 21:03

## 2022-01-01 RX ADMIN — SODIUM CHLORIDE 1000 ML: 0.9 INJECTION, SOLUTION INTRAVENOUS at 20:03

## 2022-01-01 RX ADMIN — DIPHENHYDRAMINE HYDROCHLORIDE 25 MG: 50 INJECTION INTRAMUSCULAR; INTRAVENOUS at 21:03

## 2022-01-02 NOTE — ED PROVIDER NOTES
History  Chief Complaint   Patient presents with    Migraine     for several days and right facial drrop per patient/ started 1 hour ago     60-year-old female presents the emergency department seeing evaluation for migraine for several days with reported right-sided facial droop  Patient states that she believes the symptoms began greater than 3 hours ago  Patient has a history significant for frequent migraines and ocular migraines  For facial droop on physical exam appears limited to a mild ptosis of the right eye with compared to the left however the rest of the face appears to be spared  No difficulty speaking no deficits in the upper or lower extremities  Patient reports being generally healthy otherwise  Patient offers that symptoms might be related to migraine  Allergies reviewed          Prior to Admission Medications   Prescriptions Last Dose Informant Patient Reported? Taking? butalbital-acetaminophen-caffeine (FIORICET,ESGIC) -40 mg per tablet  Self Yes No   Sig: Take 1 tablet by mouth every 4 (four) hours as needed for headaches   levonorgestrel (Mirena, 52 MG,) 20 MCG/24HR IUD   Yes No   meloxicam (MOBIC) 7 5 mg tablet   Yes No   Patient not taking: Reported on 7/21/2021      Facility-Administered Medications: None       Past Medical History:   Diagnosis Date    COVID     diagnoses 12/23/2021    Migraine     Tendonitis     right arm       Past Surgical History:   Procedure Laterality Date    ADENOIDECTOMY      CYSTOSCOPY      HERNIA REPAIR      TONSILLECTOMY      WISDOM TOOTH EXTRACTION         History reviewed  No pertinent family history  I have reviewed and agree with the history as documented  E-Cigarette/Vaping    E-Cigarette Use Never User      E-Cigarette/Vaping Substances     Social History     Tobacco Use    Smoking status: Former Smoker    Smokeless tobacco: Never Used   Vaping Use    Vaping Use: Never used   Substance Use Topics    Alcohol use:  Yes Alcohol/week: 1 0 standard drink     Types: 1 Glasses of wine per week    Drug use: Never       Review of Systems   Constitutional: Negative for chills, fatigue and fever  HENT: Negative for congestion, ear pain, rhinorrhea, sinus pressure, sneezing, sore throat and trouble swallowing  Eyes: Positive for photophobia  Negative for discharge and itching  Respiratory: Negative for cough, chest tightness, shortness of breath, wheezing and stridor  Cardiovascular: Negative for chest pain and palpitations  Gastrointestinal: Negative for abdominal pain, diarrhea, nausea and vomiting  Neurological: Positive for headaches  Negative for dizziness, syncope and numbness  All other systems reviewed and are negative  Physical Exam  Physical Exam  Vitals and nursing note reviewed  Constitutional:       General: She is not in acute distress  Appearance: She is well-developed  She is not diaphoretic  HENT:      Head: Normocephalic and atraumatic  Right Ear: External ear normal       Left Ear: External ear normal       Nose: Nose normal    Eyes:      General: Lids are normal  Lids are everted, no foreign bodies appreciated  Vision grossly intact  Gaze aligned appropriately  Extraocular Movements: Extraocular movements intact  Conjunctiva/sclera: Conjunctivae normal       Pupils: Pupils are equal, round, and reactive to light  Comments: Minimal ptosis of right eye when compared to left  Patient able to open and close eyes equally as well as raise the eyebrows  Some decreased wrinkling of the right forehead is noted when compared to the left   Cardiovascular:      Rate and Rhythm: Normal rate and regular rhythm  Heart sounds: Normal heart sounds  No murmur heard  No friction rub  No gallop  Pulmonary:      Effort: Pulmonary effort is normal  No respiratory distress  Breath sounds: Normal breath sounds  No stridor  No wheezing or rales     Abdominal:      General: Bowel sounds are normal  There is no distension  Palpations: Abdomen is soft  Tenderness: There is no abdominal tenderness  There is no guarding  Musculoskeletal:         General: No tenderness  Normal range of motion  Cervical back: Normal range of motion  Skin:     General: Skin is warm  Capillary Refill: Capillary refill takes less than 2 seconds  Neurological:      Mental Status: She is alert and oriented to person, place, and time           Vital Signs  ED Triage Vitals [01/01/22 1928]   Temperature Pulse Respirations Blood Pressure SpO2   98 5 °F (36 9 °C) 91 20 (!) 173/92 100 %      Temp Source Heart Rate Source Patient Position - Orthostatic VS BP Location FiO2 (%)   Oral Monitor Sitting Left arm --      Pain Score       6           Vitals:    01/01/22 1928 01/01/22 2100   BP: (!) 173/92 155/89   Pulse: 91 81   Patient Position - Orthostatic VS: Sitting Sitting         Visual Acuity      ED Medications  Medications   sodium chloride 0 9 % bolus 1,000 mL (1,000 mL Intravenous New Bag 1/1/22 2003)   metoclopramide (REGLAN) injection 10 mg (10 mg Intravenous Given 1/1/22 2103)   diphenhydrAMINE (BENADRYL) injection 25 mg (25 mg Intravenous Given 1/1/22 2103)   acetaminophen (TYLENOL) tablet 650 mg (650 mg Oral Given 1/1/22 2102)   ketorolac (TORADOL) injection 15 mg (15 mg Intravenous Given 1/1/22 2103)       Diagnostic Studies  Results Reviewed     Procedure Component Value Units Date/Time    Urine Microscopic [[id number]]  (Abnormal) Collected: 01/01/22 2010    Lab Status: Final result Specimen: Urine Updated: 01/01/22 2110     RBC, UA 2-4 /hpf      WBC, UA 0-1 /hpf      Epithelial Cells Moderate /hpf      Bacteria, UA Occasional /hpf      MUCUS THREADS Innumerable    Basic metabolic panel [[id number]] Collected: 01/01/22 2003    Lab Status: Final result Specimen: Blood from Arm, Right Updated: 01/01/22 2106     Sodium 138 mmol/L      Potassium 3 6 mmol/L      Chloride 105 mmol/L CO2 27 mmol/L      ANION GAP 6 mmol/L      BUN 14 mg/dL      Creatinine 0 79 mg/dL      Glucose 88 mg/dL      Calcium 8 6 mg/dL      eGFR 93 ml/min/1 73sq m     Narrative:      [place] guidelines for Chronic Kidney Disease (CKD):     Stage 1 with normal or high GFR (GFR > 90 mL/min/1 73 square meters)    Stage 2 Mild CKD (GFR = 60-89 mL/min/1 73 square meters)    Stage 3A Moderate CKD (GFR = 45-59 mL/min/1 73 square meters)    Stage 3B Moderate CKD (GFR = 30-44 mL/min/1 73 square meters)    Stage 4 Severe CKD (GFR = 15-29 mL/min/1 73 square meters)    Stage 5 End Stage CKD (GFR <15 mL/min/1 73 square meters)  Note: GFR calculation is accurate only with a steady state creatinine    Hepatic function panel [[id number]]  (Normal) Collected: 01/01/22 2003    Lab Status: Final result Specimen: Blood from Arm, Right Updated: 01/01/22 2106     Total Bilirubin 0 28 mg/dL      Bilirubin, Direct 0 04 mg/dL      Alkaline Phosphatase 67 U/L      AST 24 U/L      ALT 23 U/L      Total Protein 7 0 g/dL      Albumin 4 0 g/dL     Magnesium [[id number]]  (Normal) Collected: 01/01/22 2003    Lab Status: Final result Specimen: Blood from Arm, Right Updated: 01/01/22 2106     Magnesium 1 9 mg/dL     Lipase [[id number]]  (Normal) Collected: 01/01/22 2003    Lab Status: Final result Specimen: Blood from Arm, Right Updated: 01/01/22 2106     Lipase 55 u/L     UA (URINE) with reflex to Scope [[id number]]  (Abnormal) Collected: 01/01/22 2010    Lab Status: Final result Specimen: Urine Updated: 01/01/22 2024     Color, UA Yellow     Clarity, UA Clear     Specific Gravity, UA 1 025     pH, UA 6 5     Leukocytes, UA Negative     Nitrite, UA Negative     Protein, UA Negative mg/dl      Glucose, UA Negative mg/dl      Ketones, UA Trace mg/dl      Urobilinogen, UA 0 2 E U /dl      Bilirubin, UA Negative     Blood, UA 1+    POCT pregnancy, urine [[id number]]  (Normal) Resulted: 01/01/22 2019    Lab Status: Final result Updated: 01/01/22 2019     EXT PREG TEST UR (Ref: Negative) Negative     Control Valid    CBC and differential [[id number]] Collected: 01/01/22 2003    Lab Status: Final result Specimen: Blood from Arm, Right Updated: 01/01/22 2010     WBC 8 14 Thousand/uL      RBC 4 56 Million/uL      Hemoglobin 13 9 g/dL      Hematocrit 41 9 %      MCV 92 fL      MCH 30 5 pg      MCHC 33 2 g/dL      RDW 13 3 %      MPV 10 7 fL      Platelets 529 Thousands/uL      nRBC 0 /100 WBCs      Neutrophils Relative 47 %      Immat GRANS % 0 %      Lymphocytes Relative 41 %      Monocytes Relative 10 %      Eosinophils Relative 2 %      Basophils Relative 0 %      Neutrophils Absolute 3 80 Thousands/µL      Immature Grans Absolute 0 02 Thousand/uL      Lymphocytes Absolute 3 34 Thousands/µL      Monocytes Absolute 0 77 Thousand/µL      Eosinophils Absolute 0 19 Thousand/µL      Basophils Absolute 0 02 Thousands/µL     Lyme Antibody Profile with reflex to [facility] [[id number]] Collected: 01/01/22 2003    Lab Status: In process Specimen: Blood from Arm, Right Updated: 01/01/22 2007                 CT head without contrast   Final Result by Brennon Lam MD (01/01 2031)      No acute intracranial abnormality  Probable 0 9 cm calcified meningioma in right anterior frontal region  Moderate ethmoid sinus disease  The study was marked in [facility] for immediate notification  Workstation performed: [id number]                    Procedures  Procedures         ED Course                                             MDM  Number of Diagnoses or Management Options  Bell's palsy  Meningioma (HCC)  Migraine  Sinusitis  Diagnosis management comments: Suspect Bell's palsy vs   Atypical migraine  Case discussed with neurology Dr Maura Florez who feels the patient's diagnosis favors Bell's palsy  To be initiated on steroids  Patient feels better after treatment emergency department    Patient educated regarding their diagnosis and given return and follow-up instructions  Patient was advised to returned to the ED with worsening symptoms or concerns  Patient is understanding of and in agreement with the treatment plan  There are no questions at the time of discharge  Amount and/or Complexity of Data Reviewed  Clinical lab tests: ordered and reviewed  Tests in the radiology section of CPT®: ordered and reviewed    Risk of Complications, Morbidity, and/or Mortality  Presenting problems: moderate  Diagnostic procedures: low  Management options: low    Patient Progress  Patient progress: stable      Disposition  Final diagnoses:   Meningioma ([facility] )   Sinusitis   Migraine   Bell's palsy     Time reflects when diagnosis was documented in both MDM as applicable and the Disposition within this note     Time User Action Codes Description Comment    1/1/2022  8:36 PM Alexia Sleet Add [D32 9] Meningioma ([facility] )     1/1/2022  8:36 PM Alexia Sleet Add [J32 9] Sinusitis     1/1/2022  8:36 PM Alexia Sleet Add [G43 909] Migraine     1/1/2022  9:44 PM Alexia Sleet Add [G51 0] Bell's palsy       ED Disposition     ED Disposition Condition Date/Time Comment    Discharge Stable Sat Jan 1, 2022  9:44 PM Deonna Brown discharge to home/self care  Follow-up Information    None         Patient's Medications   Discharge Prescriptions    FLUTICASONE (FLONASE) 50 MCG/ACT NASAL SPRAY    1 spray into each nostril daily       Start Date: 1/1/2022  End Date: --       Order Dose: 1 spray       Quantity: 16 g    Refills: 0    PREDNISONE 20 MG TABLET    Take 3 tablets (60 mg total) by mouth daily for 7 days       Start Date: 1/1/2022  End Date: 1/8/2022       Order Dose: 60 mg       Quantity: 21 tablet    Refills: 0       No discharge procedures on file      PDMP Review     None          ED Provider  Electronically Signed by           Tiffani De Oliveira PA-C  01/01/22 4343

## 2022-01-02 NOTE — DISCHARGE INSTRUCTIONS
Probable 0 9 cm calcified meningioma in right anterior frontal region  Moderate ethmoid sinus disease

## 2022-01-04 LAB — B BURGDOR IGG+IGM SER-ACNC: 18

## 2022-01-05 LAB
ATRIAL RATE: 89 BPM
P AXIS: 52 DEGREES
PR INTERVAL: 148 MS
QRS AXIS: 43 DEGREES
QRSD INTERVAL: 84 MS
QT INTERVAL: 364 MS
QTC INTERVAL: 442 MS
T WAVE AXIS: 49 DEGREES
VENTRICULAR RATE: 89 BPM

## 2022-01-05 PROCEDURE — 93010 ELECTROCARDIOGRAM REPORT: CPT | Performed by: INTERNAL MEDICINE

## 2022-02-23 NOTE — PRE-PROCEDURE INSTRUCTIONS
Pre-Surgery Instructions:    Have you had / have a sore throat? no  have you had / have a cough less than 1 week?no   Have you had / have a fever greater than 100 0 - 100  4? no  Are you experiencing any shortness of breath?no    Pre procedure instructions given, verbalizes understanding all questions answered at this time awaiting Tos call 2/28           Medication Instructions    Acetaminophen (TYLENOL 8 HOUR PO) Instructed patient per Anesthesia Guidelines  taking as needed    levonorgestrel (Mirena, 52 MG,) 20 MCG/24HR IUD Instructed patient per Anesthesia Guidelines

## 2022-02-28 ENCOUNTER — ANESTHESIA EVENT (OUTPATIENT)
Dept: PERIOP | Facility: HOSPITAL | Age: 42
End: 2022-02-28
Payer: COMMERCIAL

## 2022-02-28 PROBLEM — R51.9 HEADACHE: Status: ACTIVE | Noted: 2022-02-28

## 2022-02-28 PROBLEM — E66.9 OBESITY: Status: ACTIVE | Noted: 2022-02-28

## 2022-02-28 NOTE — H&P
H&P Exam - Amanda Martínez 39 y o  female MRN: [id number]    Unit/Bed#:  Encounter: [id number]    Assessment:   Lipo dystrophy of the abdomen    Plan:  Circumferential lipectomy    History of Present Illness   This is a 61-year-old female who had a gastro plasty excision of liver adenoma and gastric sleeve through an open incision  Postoperatively the patient dehisced her open upper midline incision  This healed by 2nd intention  The patient has had massive weight loss and has multiple redundant folds of skin and fat with a chronic intertriginous rash in the suprapubic and groin folds  The patient will undergo a circumferential lipectomy  She is not the best candidate for this procedure put will have a significant improvement she is aware    Review of Systems   All other systems reviewed and are negative        Historical Information   Past Medical History:   Diagnosis Date    COVID     diagnoses 12/23/2021    Migraine     Tendonitis     right arm     Past Surgical History:   Procedure Laterality Date    ABDOMINAL SURGERY      ADENOIDECTOMY      CHOLECYSTECTOMY      CYSTOSCOPY      HERNIA REPAIR      TONSILLECTOMY      WISDOM TOOTH EXTRACTION       Social History   Social History     Substance and Sexual Activity   Alcohol Use Yes    Alcohol/week: 1 0 standard drink    Types: 1 Glasses of wine per week     Social History     Substance and Sexual Activity   Drug Use Never     Social History     Tobacco Use   Smoking Status Former Smoker   Smokeless Tobacco Never Used     E-Cigarette Use: Never User     E-Cigarette/Vaping Substances       Family History: non-contributory    Meds/Allergies   all medications and allergies reviewed  Allergies   Allergen Reactions    Fentanyl Itching     Other reaction(s): VOMITING    Hydromorphone Rash    Morphine Itching     Other reaction(s): VOMITING       Objective   First Vitals:   Weight - Scale: 94 3 kg (208 lb) (02/23/22 1643)    Current Vitals:   Weight - Scale: 94 3 kg (208 lb) (02/23/22 1643)    No intake or output data in the 24 hours ending 02/28/22 1801    Invasive Devices  Report    None                 Physical Exam  Examination of the head ears eyes nose and throat is within normal limits heart sounds S1-S2 heard no murmurs or gallops lungs clear abdomen soft there is scarring in the midline above the umbilicus there were multiple folds of redundant fat skin extremities are within normal limits  Lab Results:   Imaging:   EKG, Pathology, and Other Studies:     Code Status: No Order  Advance Directive and Living Will:      Power of :    POLST:      Counseling / Coordination of Care:   None

## 2022-02-28 NOTE — ANESTHESIA PREPROCEDURE EVALUATION
Procedure:  LIPECTOMY (N/A Abdomen)  CIRCUMFERENTIAL (N/A Abdomen)    Relevant Problems   CARDIO (within normal limits)      ENDO (within normal limits)      GI/HEPATIC (within normal limits)      /RENAL (within normal limits)      HEMATOLOGY (within normal limits)      MUSCULOSKELETAL (within normal limits)      NEURO/PSYCH   (+) Headache      PULMONARY (within normal limits)      Other   (+) Obesity        Physical Exam    Airway    Mallampati score: II  TM Distance: >3 FB  Neck ROM: full     Dental       Cardiovascular  Cardiovascular exam normal    Pulmonary  Pulmonary exam normal     Other Findings        Anesthesia Plan  ASA Score- 3     Anesthesia Type- general with ASA Monitors  Additional Monitors:   Airway Plan: ETT  Plan Factors-Exercise tolerance (METS): >4 METS  Chart reviewed  Existing labs reviewed  Patient summary reviewed  Patient is not a current smoker  Patient not instructed to abstain from smoking on day of procedure  Patient did not smoke on day of surgery  Induction- intravenous  Postoperative Plan- Plan for postoperative opioid use  Informed Consent- Anesthetic plan and risks discussed with patient  I personally reviewed this patient with the CRNA  Discussed and agreed on the Anesthesia Plan with the CRNA  Hany James           No results found for: HGBA1C    Lab Results   Component Value Date    K 3 6 01/01/2022     01/01/2022    CO2 27 01/01/2022    BUN 14 01/01/2022    CREATININE 0 79 01/01/2022    CALCIUM 8 6 01/01/2022    AST 24 01/01/2022    ALT 23 01/01/2022    ALKPHOS 67 01/01/2022    EGFR 93 01/01/2022       Lab Results   Component Value Date    WBC 8 14 01/01/2022    HGB 13 9 01/01/2022    HCT 41 9 01/01/2022    MCV 92 01/01/2022     01/01/2022   Normal sinus rhythm  Normal ECG  No previous ECGs available  Confirmed by Stephanie Diaz ([id number]) on 1/5/2022 7:37:03 AM

## 2022-03-01 ENCOUNTER — ANESTHESIA (OUTPATIENT)
Dept: PERIOP | Facility: HOSPITAL | Age: 42
End: 2022-03-01
Payer: COMMERCIAL

## 2022-03-01 ENCOUNTER — HOSPITAL ENCOUNTER (OUTPATIENT)
Facility: HOSPITAL | Age: 42
Setting detail: OUTPATIENT SURGERY
Discharge: HOME | End: 2022-03-02
Attending: PLASTIC SURGERY | Admitting: PLASTIC SURGERY
Payer: COMMERCIAL

## 2022-03-01 DIAGNOSIS — E88.1 LIPODYSTROPHY: Primary | ICD-10-CM

## 2022-03-01 DIAGNOSIS — E88.1 LIPODYSTROPHY, NOT ELSEWHERE CLASSIFIED: ICD-10-CM

## 2022-03-01 LAB
EXT PREGNANCY TEST URINE: NEGATIVE
EXT. CONTROL: NORMAL

## 2022-03-01 PROCEDURE — 88302 TISSUE EXAM BY PATHOLOGIST: CPT | Performed by: SPECIALIST

## 2022-03-01 PROCEDURE — 81025 URINE PREGNANCY TEST: CPT | Performed by: PLASTIC SURGERY

## 2022-03-01 RX ORDER — ONDANSETRON 2 MG/ML
4 INJECTION INTRAMUSCULAR; INTRAVENOUS EVERY 6 HOURS PRN
Status: DISCONTINUED | OUTPATIENT
Start: 2022-03-01 | End: 2022-03-02 | Stop reason: HOSPADM

## 2022-03-01 RX ORDER — HYDROMORPHONE HCL/PF 1 MG/ML
0.5 SYRINGE (ML) INJECTION
Status: DISCONTINUED | OUTPATIENT
Start: 2022-03-01 | End: 2022-03-01 | Stop reason: HOSPADM

## 2022-03-01 RX ORDER — OXYCODONE HYDROCHLORIDE AND ACETAMINOPHEN 5; 325 MG/1; MG/1
1 TABLET ORAL EVERY 4 HOURS PRN
Status: DISCONTINUED | OUTPATIENT
Start: 2022-03-01 | End: 2022-03-02 | Stop reason: HOSPADM

## 2022-03-01 RX ORDER — PROMETHAZINE HYDROCHLORIDE 25 MG/ML
12.5 INJECTION, SOLUTION INTRAMUSCULAR; INTRAVENOUS ONCE AS NEEDED
Status: DISCONTINUED | OUTPATIENT
Start: 2022-03-01 | End: 2022-03-01 | Stop reason: HOSPADM

## 2022-03-01 RX ORDER — CEFAZOLIN SODIUM 2 G/50ML
SOLUTION INTRAVENOUS AS NEEDED
Status: DISCONTINUED | OUTPATIENT
Start: 2022-03-01 | End: 2022-03-01

## 2022-03-01 RX ORDER — ONDANSETRON 2 MG/ML
INJECTION INTRAMUSCULAR; INTRAVENOUS AS NEEDED
Status: DISCONTINUED | OUTPATIENT
Start: 2022-03-01 | End: 2022-03-01

## 2022-03-01 RX ORDER — PROPOFOL 10 MG/ML
INJECTION, EMULSION INTRAVENOUS AS NEEDED
Status: DISCONTINUED | OUTPATIENT
Start: 2022-03-01 | End: 2022-03-01

## 2022-03-01 RX ORDER — LIDOCAINE HYDROCHLORIDE AND EPINEPHRINE 5; 5 MG/ML; UG/ML
INJECTION, SOLUTION INFILTRATION; PERINEURAL AS NEEDED
Status: DISCONTINUED | OUTPATIENT
Start: 2022-03-01 | End: 2022-03-01 | Stop reason: HOSPADM

## 2022-03-01 RX ORDER — DEXTROSE MONOHYDRATE AND SODIUM CHLORIDE 5; .45 G/100ML; G/100ML
125 INJECTION, SOLUTION INTRAVENOUS CONTINUOUS
Status: DISCONTINUED | OUTPATIENT
Start: 2022-03-01 | End: 2022-03-02 | Stop reason: HOSPADM

## 2022-03-01 RX ORDER — FENTANYL CITRATE 50 UG/ML
INJECTION, SOLUTION INTRAMUSCULAR; INTRAVENOUS AS NEEDED
Status: DISCONTINUED | OUTPATIENT
Start: 2022-03-01 | End: 2022-03-01

## 2022-03-01 RX ORDER — SODIUM CHLORIDE, SODIUM LACTATE, POTASSIUM CHLORIDE, CALCIUM CHLORIDE 600; 310; 30; 20 MG/100ML; MG/100ML; MG/100ML; MG/100ML
50 INJECTION, SOLUTION INTRAVENOUS CONTINUOUS
Status: DISCONTINUED | OUTPATIENT
Start: 2022-03-01 | End: 2022-03-02 | Stop reason: HOSPADM

## 2022-03-01 RX ORDER — CEFAZOLIN SODIUM 1 G/50ML
1000 SOLUTION INTRAVENOUS EVERY 8 HOURS
Status: COMPLETED | OUTPATIENT
Start: 2022-03-01 | End: 2022-03-02

## 2022-03-01 RX ORDER — MAGNESIUM HYDROXIDE 1200 MG/15ML
LIQUID ORAL AS NEEDED
Status: DISCONTINUED | OUTPATIENT
Start: 2022-03-01 | End: 2022-03-01 | Stop reason: HOSPADM

## 2022-03-01 RX ORDER — GLYCOPYRROLATE 0.2 MG/ML
INJECTION INTRAMUSCULAR; INTRAVENOUS AS NEEDED
Status: DISCONTINUED | OUTPATIENT
Start: 2022-03-01 | End: 2022-03-01

## 2022-03-01 RX ORDER — DEXAMETHASONE SODIUM PHOSPHATE 4 MG/ML
INJECTION, SOLUTION INTRA-ARTICULAR; INTRALESIONAL; INTRAMUSCULAR; INTRAVENOUS; SOFT TISSUE AS NEEDED
Status: DISCONTINUED | OUTPATIENT
Start: 2022-03-01 | End: 2022-03-01

## 2022-03-01 RX ORDER — SCOLOPAMINE TRANSDERMAL SYSTEM 1 MG/1
1 PATCH, EXTENDED RELEASE TRANSDERMAL
Status: DISCONTINUED | OUTPATIENT
Start: 2022-03-01 | End: 2022-03-02 | Stop reason: HOSPADM

## 2022-03-01 RX ORDER — DIPHENHYDRAMINE HYDROCHLORIDE 50 MG/ML
INJECTION INTRAMUSCULAR; INTRAVENOUS AS NEEDED
Status: DISCONTINUED | OUTPATIENT
Start: 2022-03-01 | End: 2022-03-01

## 2022-03-01 RX ORDER — ROCURONIUM BROMIDE 10 MG/ML
INJECTION, SOLUTION INTRAVENOUS AS NEEDED
Status: DISCONTINUED | OUTPATIENT
Start: 2022-03-01 | End: 2022-03-01

## 2022-03-01 RX ORDER — SODIUM CHLORIDE, SODIUM LACTATE, POTASSIUM CHLORIDE, CALCIUM CHLORIDE 600; 310; 30; 20 MG/100ML; MG/100ML; MG/100ML; MG/100ML
INJECTION, SOLUTION INTRAVENOUS CONTINUOUS PRN
Status: DISCONTINUED | OUTPATIENT
Start: 2022-03-01 | End: 2022-03-01

## 2022-03-01 RX ORDER — MIDAZOLAM HYDROCHLORIDE 2 MG/2ML
INJECTION, SOLUTION INTRAMUSCULAR; INTRAVENOUS AS NEEDED
Status: DISCONTINUED | OUTPATIENT
Start: 2022-03-01 | End: 2022-03-01

## 2022-03-01 RX ORDER — HYDROMORPHONE HCL 110MG/55ML
1 PATIENT CONTROLLED ANALGESIA SYRINGE INTRAVENOUS EVERY 2 HOUR PRN
Status: DISCONTINUED | OUTPATIENT
Start: 2022-03-01 | End: 2022-03-02 | Stop reason: HOSPADM

## 2022-03-01 RX ORDER — DIPHENHYDRAMINE HYDROCHLORIDE 50 MG/ML
25 INJECTION INTRAMUSCULAR; INTRAVENOUS EVERY 6 HOURS PRN
Status: DISCONTINUED | OUTPATIENT
Start: 2022-03-01 | End: 2022-03-02 | Stop reason: HOSPADM

## 2022-03-01 RX ORDER — NEOSTIGMINE METHYLSULFATE 1 MG/ML
INJECTION INTRAVENOUS AS NEEDED
Status: DISCONTINUED | OUTPATIENT
Start: 2022-03-01 | End: 2022-03-01

## 2022-03-01 RX ORDER — SODIUM CHLORIDE, SODIUM LACTATE, POTASSIUM CHLORIDE, CALCIUM CHLORIDE 600; 310; 30; 20 MG/100ML; MG/100ML; MG/100ML; MG/100ML
125 INJECTION, SOLUTION INTRAVENOUS CONTINUOUS
Status: DISCONTINUED | OUTPATIENT
Start: 2022-03-01 | End: 2022-03-02 | Stop reason: HOSPADM

## 2022-03-01 RX ORDER — CEFAZOLIN SODIUM 2 G/50ML
2000 SOLUTION INTRAVENOUS ONCE
Status: DISCONTINUED | OUTPATIENT
Start: 2022-03-01 | End: 2022-03-01 | Stop reason: HOSPADM

## 2022-03-01 RX ORDER — ONDANSETRON 2 MG/ML
4 INJECTION INTRAMUSCULAR; INTRAVENOUS ONCE AS NEEDED
Status: DISCONTINUED | OUTPATIENT
Start: 2022-03-01 | End: 2022-03-01 | Stop reason: HOSPADM

## 2022-03-01 RX ORDER — LIDOCAINE HYDROCHLORIDE 10 MG/ML
INJECTION, SOLUTION EPIDURAL; INFILTRATION; INTRACAUDAL; PERINEURAL AS NEEDED
Status: DISCONTINUED | OUTPATIENT
Start: 2022-03-01 | End: 2022-03-01

## 2022-03-01 RX ADMIN — SODIUM CHLORIDE, SODIUM LACTATE, POTASSIUM CHLORIDE, AND CALCIUM CHLORIDE: .6; .31; .03; .02 INJECTION, SOLUTION INTRAVENOUS at 09:23

## 2022-03-01 RX ADMIN — OXYCODONE HYDROCHLORIDE AND ACETAMINOPHEN 1 TABLET: 5; 325 TABLET ORAL at 21:37

## 2022-03-01 RX ADMIN — SCOPALAMINE 1 PATCH: 1 PATCH, EXTENDED RELEASE TRANSDERMAL at 06:50

## 2022-03-01 RX ADMIN — DEXTROSE AND SODIUM CHLORIDE 125 ML/HR: 5; .45 INJECTION, SOLUTION INTRAVENOUS at 15:20

## 2022-03-01 RX ADMIN — SODIUM CHLORIDE, SODIUM LACTATE, POTASSIUM CHLORIDE, AND CALCIUM CHLORIDE: .6; .31; .03; .02 INJECTION, SOLUTION INTRAVENOUS at 06:35

## 2022-03-01 RX ADMIN — HYDROMORPHONE HYDROCHLORIDE 0.5 MG: 1 INJECTION, SOLUTION INTRAMUSCULAR; INTRAVENOUS; SUBCUTANEOUS at 14:45

## 2022-03-01 RX ADMIN — ROCURONIUM BROMIDE 20 MG: 10 INJECTION, SOLUTION INTRAVENOUS at 10:20

## 2022-03-01 RX ADMIN — SODIUM CHLORIDE, SODIUM LACTATE, POTASSIUM CHLORIDE, AND CALCIUM CHLORIDE: .6; .31; .03; .02 INJECTION, SOLUTION INTRAVENOUS at 08:20

## 2022-03-01 RX ADMIN — MIDAZOLAM 2 MG: 1 INJECTION INTRAMUSCULAR; INTRAVENOUS at 07:32

## 2022-03-01 RX ADMIN — LIDOCAINE HYDROCHLORIDE 50 MG: 10 INJECTION, SOLUTION EPIDURAL; INFILTRATION; INTRACAUDAL; PERINEURAL at 07:41

## 2022-03-01 RX ADMIN — DIPHENHYDRAMINE HYDROCHLORIDE 50 MG: 50 INJECTION, SOLUTION INTRAMUSCULAR; INTRAVENOUS at 07:50

## 2022-03-01 RX ADMIN — ROCURONIUM BROMIDE 20 MG: 10 INJECTION, SOLUTION INTRAVENOUS at 12:26

## 2022-03-01 RX ADMIN — ONDANSETRON 4 MG: 2 INJECTION INTRAMUSCULAR; INTRAVENOUS at 14:20

## 2022-03-01 RX ADMIN — DEXTROSE AND SODIUM CHLORIDE 125 ML/HR: 5; .45 INJECTION, SOLUTION INTRAVENOUS at 23:39

## 2022-03-01 RX ADMIN — SODIUM CHLORIDE, SODIUM LACTATE, POTASSIUM CHLORIDE, AND CALCIUM CHLORIDE: .6; .31; .03; .02 INJECTION, SOLUTION INTRAVENOUS at 11:33

## 2022-03-01 RX ADMIN — SODIUM CHLORIDE, SODIUM LACTATE, POTASSIUM CHLORIDE, AND CALCIUM CHLORIDE: .6; .31; .03; .02 INJECTION, SOLUTION INTRAVENOUS at 12:37

## 2022-03-01 RX ADMIN — HYDROMORPHONE HYDROCHLORIDE 0.5 MG: 1 INJECTION, SOLUTION INTRAMUSCULAR; INTRAVENOUS; SUBCUTANEOUS at 15:00

## 2022-03-01 RX ADMIN — CEFAZOLIN SODIUM 2000 MG: 2 SOLUTION INTRAVENOUS at 07:35

## 2022-03-01 RX ADMIN — SODIUM CHLORIDE, SODIUM LACTATE, POTASSIUM CHLORIDE, AND CALCIUM CHLORIDE: .6; .31; .03; .02 INJECTION, SOLUTION INTRAVENOUS at 13:22

## 2022-03-01 RX ADMIN — ROCURONIUM BROMIDE 50 MG: 10 INJECTION, SOLUTION INTRAVENOUS at 07:41

## 2022-03-01 RX ADMIN — CEFAZOLIN SODIUM 2000 MG: 2 SOLUTION INTRAVENOUS at 11:34

## 2022-03-01 RX ADMIN — FENTANYL CITRATE 100 MCG: 50 INJECTION, SOLUTION INTRAMUSCULAR; INTRAVENOUS at 07:53

## 2022-03-01 RX ADMIN — SODIUM CHLORIDE, SODIUM LACTATE, POTASSIUM CHLORIDE, AND CALCIUM CHLORIDE 50 ML/HR: .6; .31; .03; .02 INJECTION, SOLUTION INTRAVENOUS at 06:37

## 2022-03-01 RX ADMIN — GLYCOPYRROLATE 0.4 MG: 0.2 INJECTION, SOLUTION INTRAMUSCULAR; INTRAVENOUS at 14:29

## 2022-03-01 RX ADMIN — OXYCODONE HYDROCHLORIDE AND ACETAMINOPHEN 1 TABLET: 5; 325 TABLET ORAL at 17:36

## 2022-03-01 RX ADMIN — FENTANYL CITRATE 50 MCG: 50 INJECTION, SOLUTION INTRAMUSCULAR; INTRAVENOUS at 13:22

## 2022-03-01 RX ADMIN — PROPOFOL 200 MG: 10 INJECTION, EMULSION INTRAVENOUS at 07:41

## 2022-03-01 RX ADMIN — FENTANYL CITRATE 50 MCG: 50 INJECTION, SOLUTION INTRAMUSCULAR; INTRAVENOUS at 11:04

## 2022-03-01 RX ADMIN — CEFAZOLIN SODIUM 1000 MG: 1 SOLUTION INTRAVENOUS at 18:27

## 2022-03-01 RX ADMIN — DEXAMETHASONE SODIUM PHOSPHATE 8 MG: 4 INJECTION, SOLUTION INTRAMUSCULAR; INTRAVENOUS at 07:44

## 2022-03-01 RX ADMIN — NEOSTIGMINE METHYLSULFATE 4 MG: 1 INJECTION INTRAVENOUS at 14:29

## 2022-03-01 NOTE — ANESTHESIA POSTPROCEDURE EVALUATION
Post-Op Assessment Note    CV Status:  Stable    Pain management: adequate     Mental Status:  Alert and awake   Hydration Status:  Euvolemic   PONV Controlled:  Controlled   Airway Patency:  Patent      Post Op Vitals Reviewed: Yes      Staff: CRNA         No complications documented      /72 (03/01/22 1445)    Temp 98 6 °F (37 °C) (03/01/22 1443)    Pulse 99 (03/01/22 1445)   Resp 21 (03/01/22 1445)    SpO2 98 % (03/01/22 1445)

## 2022-03-01 NOTE — INTERVAL H&P NOTE
H&P reviewed  After examining the patient I find no changes in the patients condition since the H&P had been written      Vitals:    03/01/22 0630   BP: 144/86   Pulse: 75   Resp: 20   Temp: (!) 97 2 °F (36 2 °C)   SpO2: 100%

## 2022-03-01 NOTE — INTERIM OP NOTE
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

## 2022-03-01 NOTE — PLAN OF CARE
Problem: PAIN - ADULT  Goal: Verbalizes/displays adequate comfort level or baseline comfort level  Description: Interventions:  - Encourage patient to monitor pain and request assistance  - Assess pain using appropriate pain scale  - Administer analgesics based on type and severity of pain and evaluate response  - Implement non-pharmacological measures as appropriate and evaluate response  - Consider cultural and social influences on pain and pain management  - Notify physician/advanced practitioner if interventions unsuccessful or patient reports new pain  Outcome: Progressing     Problem: INFECTION - ADULT  Goal: Absence or prevention of progression during hospitalization  Description: INTERVENTIONS:  - Assess and monitor for signs and symptoms of infection  - Monitor lab/diagnostic results  - Monitor all insertion sites, i e  indwelling lines, tubes, and drains  - Monitor endotracheal if appropriate and nasal secretions for changes in amount and color  - [place] appropriate cooling/warming therapies per order  - Administer medications as ordered  - Instruct and encourage patient and family to use good hand hygiene technique  - Identify and instruct in appropriate isolation precautions for identified infection/condition  Outcome: Progressing     Problem: DISCHARGE PLANNING  Goal: Discharge to home or other facility with appropriate resources  Description: INTERVENTIONS:  - Identify barriers to discharge w/patient and caregiver  - Arrange for needed discharge resources and transportation as appropriate  - Identify discharge learning needs (meds, wound care, etc )  - Arrange for interpretive services to assist at discharge as needed  - Refer to Case Management Department for coordinating discharge planning if the patient needs post-hospital services based on physician/advanced practitioner order or complex needs related to functional status, cognitive ability, or social support system  Outcome: Progressing

## 2022-03-01 NOTE — OP NOTE
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] ventral hernia recurrent    Procedure(s) (LRB):  LIPECTOMY (N/A)  CIRCUMFERENTIAL WITH REPAIR OF RECURRENT INCISIONAL HERNIA (N/A)    Specimen(s):  ID Type Source Tests Collected by Time Destination   1 : circumferential abdominoplasty Tissue Soft Tissue, Other TISSUE EXAM Goldie Sarah MD 3/1/2022 1218        Estimated Blood Loss:   Minimal    Drains:  Closed/Suction Drain Right Back Bulb 19 Fr  (Active)   Number of days: 0       Closed/Suction Drain Left Back Bulb 19 Fr  (Active)   Number of days: 0       Closed/Suction Drain Right Hip Bulb 19 Fr  (Active)   Number of days: 0       Urethral Catheter Latex 16 Fr  (Active)   Number of days: 0       Anesthesia Type:   General    Operative Indications:  Lipodystrophy, 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]  Low back pain chronic intertriginous rash in suprapubic and groin folds    Operative Findings:  Recurrent ventral hernia above the umbilicus with exposed mesh    Complications:   None    Procedure and Technique:  Patient was marked in the holding area draped and prepped in normal sterile fashion after general endotracheal anesthesia  Patient was in the prone position  Premarked area of the back was excised hemostasis achieved electrocautery the wounds were irrigated well perforators were suture ligated with 2-0 Vicryl suture the fascia was approximated with 0 Ethibond sutures dermis with 2-0 Vicryl suture and skin with a running subcuticular 3-0 Prolene suture the waist was contoured with liposuction and patient was then placed in supine position the remaining abdominal pannus was removed    All all perforators were suture ligated and the midline was plicated with 0 Ethibond suture in a figure-eight fashion  Prior to closure of the back 2 19  Channel drains were placed through separate stab wounds in the flank anchored in place with 3-0 nylon suture  A 3rd 19  Channel drain was placed through separate stab wound in the right flank anchored in place with 3-0 nylon suture  Upon elevation of the flap there was a recurrent hernia and the in the area above the umbilicus in the midline with 3 floating mesh over the bowel  This was not adherent to the abdominal wall it was decided to leave the mesh in place and to close the hernia this was closed with figure-eight sutures using 0 Ethibond suture under direct visualization  The wounds were irrigated with normal saline the flap was then approximated with 0 Ethibond suture for fascia 2-0 Vicryl for dermis and a running subcuticular 3-0 Prolene suture for skin the umbilicus was delivered through separate stab wound in the midline this was anchored in place with 4-0 Vicryl buried sutures and 5 0 nylon half buried mattress sutures    Patient tolerated the procedure well a compression garment was placed   I was present for the entire procedure    Patient Disposition:  PACU       SIGNATURE: Eduardo Rios MD  DATE: March 1, 2022  TIME: 2:23 PM

## 2022-03-02 VITALS
DIASTOLIC BLOOD PRESSURE: 64 MMHG | WEIGHT: 208 LBS | HEART RATE: 80 BPM | SYSTOLIC BLOOD PRESSURE: 109 MMHG | HEIGHT: 64 IN | TEMPERATURE: 97.8 F | BODY MASS INDEX: 35.51 KG/M2 | RESPIRATION RATE: 18 BRPM | OXYGEN SATURATION: 99 %

## 2022-03-02 PROBLEM — E88.1 LIPODYSTROPHY: Status: ACTIVE | Noted: 2022-03-02

## 2022-03-02 RX ORDER — CEPHALEXIN 500 MG/1
500 CAPSULE ORAL EVERY 8 HOURS SCHEDULED
Qty: 21 CAPSULE | Refills: 0 | Status: SHIPPED | OUTPATIENT
Start: 2022-03-02 | End: 2022-03-09

## 2022-03-02 RX ORDER — OXYCODONE HYDROCHLORIDE AND ACETAMINOPHEN 5; 325 MG/1; MG/1
1 TABLET ORAL EVERY 4 HOURS PRN
Qty: 30 TABLET | Refills: 0 | Status: SHIPPED | OUTPATIENT
Start: 2022-03-02 | End: 2022-03-12

## 2022-03-02 RX ADMIN — OXYCODONE HYDROCHLORIDE AND ACETAMINOPHEN 1 TABLET: 5; 325 TABLET ORAL at 07:53

## 2022-03-02 RX ADMIN — CEFAZOLIN SODIUM 1000 MG: 1 SOLUTION INTRAVENOUS at 00:04

## 2022-03-02 RX ADMIN — OXYCODONE HYDROCHLORIDE AND ACETAMINOPHEN 1 TABLET: 5; 325 TABLET ORAL at 14:00

## 2022-03-02 RX ADMIN — DEXTROSE AND SODIUM CHLORIDE 125 ML/HR: 5; .45 INJECTION, SOLUTION INTRAVENOUS at 08:31

## 2022-03-02 RX ADMIN — OXYCODONE HYDROCHLORIDE AND ACETAMINOPHEN 1 TABLET: 5; 325 TABLET ORAL at 01:54

## 2022-03-02 NOTE — CASE MANAGEMENT
Case Management Discharge Planning Note    Patient name Emiliana Contreras  Location [facility] 710/[facility]T [facility] 710-01 MRN [id number]  : 1980 Date 3/2/2022       Current Admission Date: 3/1/2022  Current Admission Diagnosis:Lipodystrophy   Patient Active Problem List    Diagnosis Date Noted    Lipodystrophy 2022    Headache 2022    Obesity 2022      LOS (days): 0  Geometric Mean LOS (GMLOS) (days):   Days to [address]:     OBJECTIVE:        Current admission status: Outpatient Surgery   Preferred Pharmacy:   [facility] [address] [facility] [address], [address] [address]  Phone: [phone] Fax: [phone]    Primary Care Provider: Ruddy New DO    Primary Insurance: [employer] SELF PAY  Secondary Insurance: [employer] SHIELD    DISCHARGE DETAILS: CM was notified at morning rounds that pt is medically cleared to be discharged today  Pt will be returning back to her previous environment    Transportation: Family

## 2022-03-02 NOTE — PLAN OF CARE
Problem: MOBILITY - ADULT  Goal: Maintain or return to baseline ADL function  Description: INTERVENTIONS:  -  Assess patient's ability to carry out ADLs; assess patient's baseline for ADL function and identify physical deficits which impact ability to perform ADLs (bathing, care of mouth/teeth, toileting, grooming, dressing, etc )  - Assess/evaluate cause of self-care deficits   - Assess range of motion  - Assess patient's mobility; develop plan if impaired  - Assess patient's need for assistive devices and provide as appropriate  - Encourage maximum independence but intervene and supervise when necessary  - Involve family in performance of ADLs  - Assess for home care needs following discharge   - Consider OT consult to assist with ADL evaluation and planning for discharge  - Provide patient education as appropriate  Outcome: Progressing     Problem: Prexisting or High Potential for Compromised Skin Integrity  Goal: Skin integrity is maintained or improved  Description: INTERVENTIONS:  - Identify patients at risk for skin breakdown  - Assess and monitor skin integrity  - Assess and monitor nutrition and hydration status  - Monitor labs   - Assess for incontinence   - Turn and reposition patient  - Assist with mobility/ambulation  - Relieve pressure over bony prominences  - Avoid friction and shearing  - Provide appropriate hygiene as needed including keeping skin clean and dry  - Evaluate need for skin moisturizer/barrier cream  - Collaborate with interdisciplinary team   - Patient/family teaching  - Consider wound care consult   Outcome: Progressing     Problem: PAIN - ADULT  Goal: Verbalizes/displays adequate comfort level or baseline comfort level  Description: Interventions:  - Encourage patient to monitor pain and request assistance  - Assess pain using appropriate pain scale  - Administer analgesics based on type and severity of pain and evaluate response  - Implement non-pharmacological measures as appropriate and evaluate response  - Consider cultural and social influences on pain and pain management  - Notify physician/advanced practitioner if interventions unsuccessful or patient reports new pain  Outcome: Progressing

## 2022-03-02 NOTE — DISCHARGE INSTRUCTIONS
Day of Surgery Postoperative Instrutions    1  You will stay most of the next day following surgery  You will be out of bed and walking first thing in the morning  The nurses will instruct you on how to activate your drains and measure the amount of drainage  2  You will go home with your drains, keep track of the total amount of drainage every 12 hour period in call the office in 4 days to report the amount of drainage that is coming out  When the drainage slows down, the office staff will instruct you to come into the office at which time your drains will be removed  3  The day after arrive at home you may shower  To shower; remove your garment, removed all gauze and pin your drains to a lanyard around your neck so that the drains do not hang down to the floor  Wash gently with soap and water, also over the suture line it is, and then pat dry  You do not have to replace the gauze! Use a white T-shift as a dressing and tuck this into your garment (the way you would normally wear a T-shirt)  4  You will be given a prescription for antibiotics and pain medication  Always try to eat something before you take the medications  Do not take both medications at the same time, because this may cause nausea  Please report any adverse reactions to the medications to the office  5  Monitor urine color  Dark Yellow urine indicates you are not drinking enough water  Urine should be light yellow  - STAY HYDRATED and Low Sodium Diet  -   6  Your stitches will be removed on postoperative day 10, and your umbilicus stitches (belly button) may stay in 5 days longer  Once stitches are removed, you will begin to put scar cream on your incisions twice daily  7  You will be expected to massage yashira where liposuction has been performed as instructed: 5 times daily for 10 minutes for 6 weeks, as swelling subsides   When walking, try to stand as straight as possible; when lying down, put pillow under your back and under your knees so you are slightly flexed for comfort  8  Full wound healing takes approximately 3 months, and you are expected to use scar cream on the incisions for the same amount of time  9  Once your sutures are removed, we encourage that you slowly increase exercises that increase your heart rate; such as, an elliptical (without the arm movement), brisk walks and stationary bicycle  You should avoid any strenuous  exercises that invole core muscles of the abdomen for 3 months  10  No Heating Pads / Microwave Beads / or Ic   11  Do no treat yourself by any other means without contacting the office      PLEASE ADHERE STRICTLY TO THE ABOVE INSTRUCTIONS

## 2022-03-02 NOTE — NURSING NOTE
Went over discharge instructions with patient  Pt understood all instructions  Also explained the drain sheet for documenting the output, how to properly empty the drain and apply the suction  Pt did not have any additional questions

## 2022-07-13 ENCOUNTER — APPOINTMENT (EMERGENCY)
Dept: CT IMAGING | Facility: HOSPITAL | Age: 42
End: 2022-07-13
Payer: COMMERCIAL

## 2022-07-13 ENCOUNTER — HOSPITAL ENCOUNTER (EMERGENCY)
Facility: HOSPITAL | Age: 42
Discharge: HOME | End: 2022-07-13
Attending: FAMILY MEDICINE | Admitting: FAMILY MEDICINE
Payer: COMMERCIAL

## 2022-07-13 VITALS
RESPIRATION RATE: 20 BRPM | BODY MASS INDEX: 35.51 KG/M2 | HEIGHT: 64 IN | HEART RATE: 85 BPM | TEMPERATURE: 98.8 F | DIASTOLIC BLOOD PRESSURE: 96 MMHG | WEIGHT: 208 LBS | OXYGEN SATURATION: 100 % | SYSTOLIC BLOOD PRESSURE: 154 MMHG

## 2022-07-13 DIAGNOSIS — G43.909 MIGRAINE HEADACHE: Primary | ICD-10-CM

## 2022-07-13 PROCEDURE — 99284 EMERGENCY DEPT VISIT MOD MDM: CPT | Performed by: FAMILY MEDICINE

## 2022-07-13 PROCEDURE — 70450 CT HEAD/BRAIN W/O DYE: CPT

## 2022-07-13 PROCEDURE — 96365 THER/PROPH/DIAG IV INF INIT: CPT

## 2022-07-13 PROCEDURE — G1004 CDSM NDSC: HCPCS

## 2022-07-13 PROCEDURE — 99283 EMERGENCY DEPT VISIT LOW MDM: CPT

## 2022-07-13 PROCEDURE — 96375 TX/PRO/DX INJ NEW DRUG ADDON: CPT

## 2022-07-13 RX ORDER — MAGNESIUM SULFATE 1 G/100ML
1 INJECTION INTRAVENOUS ONCE
Status: COMPLETED | OUTPATIENT
Start: 2022-07-13 | End: 2022-07-13

## 2022-07-13 RX ORDER — KETOROLAC TROMETHAMINE 30 MG/ML
30 INJECTION, SOLUTION INTRAMUSCULAR; INTRAVENOUS ONCE
Status: COMPLETED | OUTPATIENT
Start: 2022-07-13 | End: 2022-07-13

## 2022-07-13 RX ORDER — DIPHENHYDRAMINE HYDROCHLORIDE 50 MG/ML
25 INJECTION INTRAMUSCULAR; INTRAVENOUS ONCE
Status: COMPLETED | OUTPATIENT
Start: 2022-07-13 | End: 2022-07-13

## 2022-07-13 RX ORDER — ONDANSETRON 2 MG/ML
4 INJECTION INTRAMUSCULAR; INTRAVENOUS ONCE
Status: COMPLETED | OUTPATIENT
Start: 2022-07-13 | End: 2022-07-13

## 2022-07-13 RX ADMIN — ONDANSETRON 4 MG: 2 INJECTION INTRAMUSCULAR; INTRAVENOUS at 09:52

## 2022-07-13 RX ADMIN — DIPHENHYDRAMINE HYDROCHLORIDE 25 MG: 50 INJECTION, SOLUTION INTRAMUSCULAR; INTRAVENOUS at 09:58

## 2022-07-13 RX ADMIN — MAGNESIUM SULFATE HEPTAHYDRATE 1 G: 1 INJECTION, SOLUTION INTRAVENOUS at 09:59

## 2022-07-13 RX ADMIN — KETOROLAC TROMETHAMINE 30 MG: 30 INJECTION, SOLUTION INTRAMUSCULAR at 09:54

## 2022-07-13 NOTE — ED PROVIDER NOTES
History  Chief Complaint   Patient presents with    Migraine     Started at 0500 this morning     HPI  This is a 80-year-old female with history of migraine that is normally associated with the eye droop or facial droop presented to ED with the complain of exacerbation of migraine  She states she has seen by Neurology had extensive workup was diagnosed with the migraine  She states she is out of her medication which prompted this ED visit  She is rating her pain 8/10 also complain of vomiting  Denies any weakness  No facial droop noted on exam   Prior to Admission Medications   Prescriptions Last Dose Informant Patient Reported? Taking?   levonorgestrel (Mirena, 52 MG,) 20 MCG/24HR IUD   Yes No      Facility-Administered Medications: None       Past Medical History:   Diagnosis Date    COVID     diagnoses 12/23/2021    Migraine     Tendonitis     right arm       Past Surgical History:   Procedure Laterality Date    ABDOMINAL SURGERY      ABDOMINOPLASTY N/A 3/1/2022    Procedure: CIRCUMFERENTIAL WITH REPAIR OF RECURRENT INCISIONAL HERNIA;  Surgeon: Radu De La Rosa MD;  Location: [facility] MAIN OR;  Service: Plastics    ADENOIDECTOMY      CHOLECYSTECTOMY      CYSTOSCOPY      GASTROPLASTY      HERNIA REPAIR      PANNICULECTOMY N/A 3/1/2022    Procedure: LIPECTOMY;  Surgeon: Radu De La Rosa MD;  Location: [facility] MAIN OR;  Service: Plastics    TONSILLECTOMY      WISDOM TOOTH EXTRACTION         History reviewed  No pertinent family history  I have reviewed and agree with the history as documented  E-Cigarette/Vaping    E-Cigarette Use Never User      E-Cigarette/Vaping Substances     Social History     Tobacco Use    Smoking status: Former Smoker    Smokeless tobacco: Never Used   Vaping Use    Vaping Use: Never used   Substance Use Topics    Alcohol use: Yes     Alcohol/week: 1 0 standard drink     Types: 1 Glasses of wine per week    Drug use: Never       Review of Systems   Constitutional: Negative  HENT: Negative  Eyes: Negative  Respiratory: Negative  Cardiovascular: Negative  Gastrointestinal: Negative  Endocrine: Negative  Genitourinary: Negative  Musculoskeletal: Negative  Skin: Negative  Allergic/Immunologic: Negative  Neurological: Positive for headaches  Hematological: Negative  Psychiatric/Behavioral: Negative  Physical Exam  Physical Exam  Vitals and nursing note reviewed  Constitutional:       Appearance: She is well-developed  HENT:      Head: Normocephalic and atraumatic  Right Ear: External ear normal       Left Ear: External ear normal       Nose: Nose normal       Mouth/Throat:      Mouth: Mucous membranes are moist       Pharynx: No oropharyngeal exudate  Eyes:      General: No scleral icterus  Right eye: No discharge  Left eye: No discharge  Conjunctiva/sclera: Conjunctivae normal       Pupils: Pupils are equal, round, and reactive to light  Cardiovascular:      Rate and Rhythm: Normal rate and regular rhythm  Pulses: Normal pulses  Heart sounds: Normal heart sounds  Pulmonary:      Effort: Pulmonary effort is normal  No respiratory distress  Breath sounds: Normal breath sounds  No wheezing  Abdominal:      General: Bowel sounds are normal       Palpations: Abdomen is soft  Musculoskeletal:         General: Normal range of motion  Cervical back: Normal range of motion and neck supple  Lymphadenopathy:      Cervical: No cervical adenopathy  Skin:     General: Skin is warm and dry  Capillary Refill: Capillary refill takes less than 2 seconds  Neurological:      General: No focal deficit present  Mental Status: She is alert and oriented to person, place, and time     Psychiatric:         Mood and Affect: Mood normal          Behavior: Behavior normal          Vital Signs  ED Triage Vitals [07/13/22 0930]   Temperature Pulse Respirations Blood Pressure SpO2   98 8 °F (37 1 °C) 85 20 154/96 100 %      Temp Source Heart Rate Source Patient Position - Orthostatic VS BP Location FiO2 (%)   Oral Monitor Sitting Left arm --      Pain Score       7           Vitals:    07/13/22 0930   BP: 154/96   Pulse: 85   Patient Position - Orthostatic VS: Sitting         Visual Acuity      ED Medications  Medications   magnesium sulfate IVPB (premix) SOLN 1 g (1 g Intravenous New Bag 7/13/22 0959)   ketorolac (TORADOL) injection 30 mg (30 mg Intravenous Given 7/13/22 0954)   diphenhydrAMINE (BENADRYL) injection 25 mg (25 mg Intravenous Given 7/13/22 0958)   ondansetron (ZOFRAN) injection 4 mg (4 mg Intravenous Given 7/13/22 1790)       Diagnostic Studies  Results Reviewed     None                 CT head wo contrast   Final Result by Kristyn Lee MD (07/13 4439)      No acute intracranial abnormality  Workstation performed: [id number]                    Procedures  Procedures         ED Course  ED Course as of 07/13/22 1058   Wed Jul 13, 2022   1057 Patient states she is feeling much better pain resolved requesting to be discharged home  Eyelid palsy resolved precaution provided regarding return recommended continue taking medication at home and follow-up with Neurology                                             MDM    Disposition  Final diagnoses:   Migraine headache     Time reflects when diagnosis was documented in both MDM as applicable and the Disposition within this note     Time User Action Codes Description Comment    7/13/2022 10:58 AM John Antonio Add [G43 [id number]] Migraine headache       ED Disposition     ED Disposition   Discharge    Condition   Stable    Date/Time   Wed Jul 13, 2022 10:58 AM    Comment   Jaycee Escalera discharge to home/self care                 Follow-up Information     Follow up With Specialties Details Why Contact Jaylene Dejesus DO Family Medicine Schedule an appointment as soon as possible for a visit in 2 days If symptoms worsen [address] [facility] [phone]  [phone]            Patient's Medications   Discharge Prescriptions    No medications on file       No discharge procedures on file      PDMP Review     None          ED Provider  Electronically Signed by           Joo Pérez MD  07/13/22 9729

## 2022-10-12 ENCOUNTER — APPOINTMENT (OUTPATIENT)
Dept: RADIOLOGY | Facility: CLINIC | Age: 42
End: 2022-10-12
Payer: COMMERCIAL

## 2022-10-12 ENCOUNTER — OFFICE VISIT (OUTPATIENT)
Dept: URGENT CARE | Facility: CLINIC | Age: 42
End: 2022-10-12
Payer: COMMERCIAL

## 2022-10-12 VITALS — HEART RATE: 99 BPM | OXYGEN SATURATION: 99 % | RESPIRATION RATE: 18 BRPM | TEMPERATURE: 98.2 F

## 2022-10-12 DIAGNOSIS — R06.2 WHEEZING: ICD-10-CM

## 2022-10-12 DIAGNOSIS — J01.90 ACUTE SINUSITIS, RECURRENCE NOT SPECIFIED, UNSPECIFIED LOCATION: Primary | ICD-10-CM

## 2022-10-12 DIAGNOSIS — R05.1 ACUTE COUGH: ICD-10-CM

## 2022-10-12 PROCEDURE — 99213 OFFICE O/P EST LOW 20 MIN: CPT | Performed by: PHYSICIAN ASSISTANT

## 2022-10-12 PROCEDURE — 71046 X-RAY EXAM CHEST 2 VIEWS: CPT

## 2022-10-12 RX ORDER — AMOXICILLIN AND CLAVULANATE POTASSIUM 875; 125 MG/1; MG/1
1 TABLET, FILM COATED ORAL EVERY 12 HOURS SCHEDULED
Qty: 20 TABLET | Refills: 0 | Status: SHIPPED | OUTPATIENT
Start: 2022-10-12 | End: 2022-10-12

## 2022-10-12 RX ORDER — PREDNISONE 10 MG/1
TABLET ORAL
Qty: 26 TABLET | Refills: 0 | Status: SHIPPED | OUTPATIENT
Start: 2022-10-12

## 2022-10-12 RX ORDER — ALBUTEROL SULFATE 90 UG/1
2 AEROSOL, METERED RESPIRATORY (INHALATION) EVERY 4 HOURS PRN
Qty: 18 G | Refills: 0 | Status: SHIPPED | OUTPATIENT
Start: 2022-10-12

## 2022-10-12 RX ORDER — AMOXICILLIN AND CLAVULANATE POTASSIUM 400; 57 MG/5ML; MG/5ML
POWDER, FOR SUSPENSION ORAL
Qty: 200 ML | Refills: 0 | Status: SHIPPED | OUTPATIENT
Start: 2022-10-12 | End: 2022-10-22

## 2022-10-12 NOTE — PROGRESS NOTES
[facility]    NAME: Carolyn Nick is a 43 y o  female  : 1980    MRN: [id number]  DATE: 2022  TIME: 9:48 AM    Assessment and Plan   Acute sinusitis, recurrence not specified, unspecified location [J01 90]  1  Acute sinusitis, recurrence not specified, unspecified location  amoxicillin-clavulanate (AUGMENTIN) 875-125 mg per tablet    predniSONE 10 mg tablet   2  Acute cough  XR chest pa & lateral   3  Wheezing  albuterol (Ventolin HFA) 90 mcg/act inhaler       Patient Instructions   Patient Instructions   I have prescribed an antibiotic for the infection  Please take the antibiotic as prescribed and finish the entire prescription  I recommend that the patient takes an over the counter probiotic or eats yogurt with live cultures in it [place]) to keep good bacteria in the gut and help prevent diarrhea  Wash hands frequently to prevent the spread of infection  Can use over the counter cough and cold medications to help with symptoms  Ibuprofen and/or tylenol as needed for pain or fever  If not improving over the next 7-10 days, follow up with PCP  Prednisone as directed  Albuterol inhaler as needed  Chief Complaint     Chief Complaint   Patient presents with   • Cough     Pt c/o a cough, congestion and wheezing for two weeks  History of Present Illness   43year old female here with complaint of sinus congestion and allergy symptoms which have been getting progressively worse over the past couple weeks  Has a lot of of post nasal drip with thick mucous  Started wheezing and has chest tightness  Has had chills and fatigue  Review of Systems   Review of Systems   Constitutional: Positive for chills and fatigue  Negative for appetite change and fever  HENT: Positive for congestion, postnasal drip and sinus pressure  Negative for ear discharge, ear pain, facial swelling, sneezing and sore throat      Respiratory: Positive for cough, chest tightness and wheezing  Negative for shortness of breath  Coughed up blood last night   Gastrointestinal: Negative for abdominal pain, diarrhea, nausea and vomiting  Musculoskeletal: Positive for myalgias  Neurological: Negative for headaches  Current Medications     Current Outpatient Medications:   •  albuterol (Ventolin HFA) 90 mcg/act inhaler, Inhale 2 puffs every 4 (four) hours as needed for wheezing or shortness of breath, Disp: 18 g, Rfl: 0  •  amoxicillin-clavulanate (AUGMENTIN) 875-125 mg per tablet, Take 1 tablet by mouth every 12 (twelve) hours for 10 days, Disp: 20 tablet, Rfl: 0  •  predniSONE 10 mg tablet, Take 3 tabs BID X 2 days, 2 tabs BID X 2 days, 1 tab BID X 2 days, 1 tab daily X 2 days, Disp: 26 tablet, Rfl: 0  •  levonorgestrel (Mirena, 52 MG,) 20 MCG/24HR IUD, , Disp: , Rfl:     Current Allergies     Allergies as of 10/12/2022 - Reviewed 10/12/2022   Allergen Reaction Noted   • Fentanyl Itching 11/17/2020   • Hydromorphone Rash 11/19/2020   • Morphine Itching 11/17/2020          The following portions of the patient's history were reviewed and updated as appropriate: allergies, current medications, past family history, past medical history, past social history, past surgical history and problem list    Past Medical History:   Diagnosis Date   • COVID     diagnoses 12/23/2021   • Migraine    • Tendonitis     right arm     Past Surgical History:   Procedure Laterality Date   • ABDOMINAL SURGERY     • ABDOMINOPLASTY N/A 3/1/2022    Procedure: CIRCUMFERENTIAL WITH REPAIR OF RECURRENT INCISIONAL HERNIA;  Surgeon: Sabine Meyer MD;  Location: [address] [place] [address];  Service: Plastics   • ADENOIDECTOMY     • CHOLECYSTECTOMY     • CYSTOSCOPY     • GASTROPLASTY     • HERNIA REPAIR     • PANNICULECTOMY N/A 3/1/2022    Procedure: LIPECTOMY;  Surgeon: Sabine Meyer MD;  Location: [address] [place] [address];  Service: Plastics   • TONSILLECTOMY     • WISDOM TOOTH EXTRACTION       No family history on file    Social History Socioeconomic History   • Marital status:      Spouse name: Not on file   • Number of children: Not on file   • Years of education: Not on file   • Highest education level: Not on file   Occupational History   • Not on file   Tobacco Use   • Smoking status: Former Smoker   • Smokeless tobacco: Never Used   Vaping Use   • Vaping Use: Never used   Substance and Sexual Activity   • Alcohol use: Yes     Alcohol/week: 1 0 standard drink     Types: 1 Glasses of wine per week   • Drug use: Never   • Sexual activity: Yes     Partners: Male   Other Topics Concern   • Not on file   Social History Narrative   • Not on file     Social Determinants of Health     Financial Resource Strain: Not on file   Food Insecurity: Not on file   Transportation Needs: Not on file   Physical Activity: Not on file   Stress: Not on file   Social Connections: Not on file   Intimate Partner Violence: Not on file   Housing Stability: Not on file     Medications have been verified  Objective   Pulse 99   Temp 98 2 °F (36 8 °C)   Resp 18   SpO2 99%      Physical Exam   Physical Exam  Vitals and nursing note reviewed  Constitutional:       General: She is not in acute distress  Appearance: She is well-developed  HENT:      Head: Normocephalic and atraumatic  Right Ear: Tympanic membrane normal       Left Ear: Tympanic membrane normal       Nose: Congestion present  No mucosal edema or rhinorrhea  Right Sinus: No maxillary sinus tenderness or frontal sinus tenderness  Left Sinus: No maxillary sinus tenderness or frontal sinus tenderness  Mouth/Throat:      Pharynx: Posterior oropharyngeal erythema present  No oropharyngeal exudate  Eyes:      Conjunctiva/sclera: Conjunctivae normal    Cardiovascular:      Rate and Rhythm: Normal rate and regular rhythm  Heart sounds: Normal heart sounds  No murmur heard    Pulmonary:      Effort: Pulmonary effort is normal       Breath sounds: Wheezing and rhonchi (cleared after coughing) present

## 2022-10-12 NOTE — PATIENT INSTRUCTIONS
I have prescribed an antibiotic for the infection  Please take the antibiotic as prescribed and finish the entire prescription  I recommend that the patient takes an over the counter probiotic or eats yogurt with live cultures in it [place]) to keep good bacteria in the gut and help prevent diarrhea  Wash hands frequently to prevent the spread of infection  Can use over the counter cough and cold medications to help with symptoms  Ibuprofen and/or tylenol as needed for pain or fever  If not improving over the next 7-10 days, follow up with PCP  Prednisone as directed  Albuterol inhaler as needed

## 2022-10-12 NOTE — LETTER
October 12, 2022     Patient: Shu Huffman   YOB: 1980   Date of Visit: 10/12/2022       To Whom It May Concern: It is my medical opinion that Shu Huffman should not return to work until 10/14/22 unless she feels better to return on 10/13/22  If you have any questions or concerns, please don't hesitate to call           Sincerely,        Jairo Acharya PA-C    CC: No Recipients

## 2023-03-08 ENCOUNTER — APPOINTMENT (EMERGENCY)
Dept: CT IMAGING | Facility: HOSPITAL | Age: 43
End: 2023-03-08

## 2023-03-08 ENCOUNTER — HOSPITAL ENCOUNTER (EMERGENCY)
Facility: HOSPITAL | Age: 43
Discharge: HOME | End: 2023-03-08
Attending: EMERGENCY MEDICINE | Admitting: EMERGENCY MEDICINE

## 2023-03-08 VITALS
HEART RATE: 69 BPM | SYSTOLIC BLOOD PRESSURE: 138 MMHG | RESPIRATION RATE: 18 BRPM | OXYGEN SATURATION: 98 % | DIASTOLIC BLOOD PRESSURE: 72 MMHG | BODY MASS INDEX: 38.62 KG/M2 | WEIGHT: 225 LBS | TEMPERATURE: 97.1 F

## 2023-03-08 DIAGNOSIS — R10.30 LOWER ABDOMINAL PAIN: Primary | ICD-10-CM

## 2023-03-08 LAB
ALBUMIN SERPL BCP-MCNC: 3.7 G/DL (ref 3.5–5)
ALP SERPL-CCNC: 54 U/L (ref 34–104)
ALT SERPL W P-5'-P-CCNC: 18 U/L (ref 7–52)
ANION GAP SERPL CALCULATED.3IONS-SCNC: 7 MMOL/L (ref 4–13)
APTT PPP: 25 SECONDS (ref 23–37)
AST SERPL W P-5'-P-CCNC: 14 U/L (ref 13–39)
BASOPHILS # BLD AUTO: 0.03 THOUSANDS/ÂΜL (ref 0–0.1)
BASOPHILS NFR BLD AUTO: 0 % (ref 0–1)
BILIRUB SERPL-MCNC: 0.43 MG/DL (ref 0.2–1)
BILIRUB UR QL STRIP: NEGATIVE
BUN SERPL-MCNC: 10 MG/DL (ref 5–25)
CALCIUM SERPL-MCNC: 8.8 MG/DL (ref 8.4–10.2)
CHLORIDE SERPL-SCNC: 105 MMOL/L (ref 96–108)
CLARITY UR: CLEAR
CO2 SERPL-SCNC: 26 MMOL/L (ref 21–32)
COLOR UR: NORMAL
CREAT SERPL-MCNC: 0.67 MG/DL (ref 0.6–1.3)
EOSINOPHIL # BLD AUTO: 0.22 THOUSAND/ÂΜL (ref 0–0.61)
EOSINOPHIL NFR BLD AUTO: 3 % (ref 0–6)
ERYTHROCYTE [DISTWIDTH] IN BLOOD BY AUTOMATED COUNT: 13.3 % (ref 11.6–15.1)
GFR SERPL CREATININE-BSD FRML MDRD: 108 ML/MIN/1.73SQ M
GLUCOSE SERPL-MCNC: 87 MG/DL (ref 65–140)
GLUCOSE UR STRIP-MCNC: NEGATIVE MG/DL
HCT VFR BLD AUTO: 39.2 % (ref 34.8–46.1)
HGB BLD-MCNC: 12.8 G/DL (ref 11.5–15.4)
HGB UR QL STRIP.AUTO: NEGATIVE
IMM GRANULOCYTES # BLD AUTO: 0.02 THOUSAND/UL (ref 0–0.2)
IMM GRANULOCYTES NFR BLD AUTO: 0 % (ref 0–2)
INR PPP: 0.92 (ref 0.84–1.19)
KETONES UR STRIP-MCNC: NEGATIVE MG/DL
LEUKOCYTE ESTERASE UR QL STRIP: NEGATIVE
LIPASE SERPL-CCNC: 32 U/L (ref 11–82)
LYMPHOCYTES # BLD AUTO: 2.08 THOUSANDS/ÂΜL (ref 0.6–4.47)
LYMPHOCYTES NFR BLD AUTO: 29 % (ref 14–44)
MCH RBC QN AUTO: 29.7 PG (ref 26.8–34.3)
MCHC RBC AUTO-ENTMCNC: 32.7 G/DL (ref 31.4–37.4)
MCV RBC AUTO: 91 FL (ref 82–98)
MONOCYTES # BLD AUTO: 0.61 THOUSAND/ÂΜL (ref 0.17–1.22)
MONOCYTES NFR BLD AUTO: 9 % (ref 4–12)
NEUTROPHILS # BLD AUTO: 4.18 THOUSANDS/ÂΜL (ref 1.85–7.62)
NEUTS SEG NFR BLD AUTO: 59 % (ref 43–75)
NITRITE UR QL STRIP: NEGATIVE
NRBC BLD AUTO-RTO: 0 /100 WBCS
PH UR STRIP.AUTO: 6.5 [PH]
PLATELET # BLD AUTO: 251 THOUSANDS/UL (ref 149–390)
PMV BLD AUTO: 10.8 FL (ref 8.9–12.7)
POTASSIUM SERPL-SCNC: 3.7 MMOL/L (ref 3.5–5.3)
PROT SERPL-MCNC: 6.5 G/DL (ref 6.4–8.4)
PROT UR STRIP-MCNC: NEGATIVE MG/DL
PROTHROMBIN TIME: 12.3 SECONDS (ref 11.6–14.5)
RBC # BLD AUTO: 4.31 MILLION/UL (ref 3.81–5.12)
SODIUM SERPL-SCNC: 138 MMOL/L (ref 135–147)
SP GR UR STRIP.AUTO: 1.01
UROBILINOGEN UR QL STRIP.AUTO: 0.2 E.U./DL
WBC # BLD AUTO: 7.14 THOUSAND/UL (ref 4.31–10.16)

## 2023-03-08 RX ORDER — SODIUM CHLORIDE 9 MG/ML
150 INJECTION, SOLUTION INTRAVENOUS CONTINUOUS
Status: DISCONTINUED | OUTPATIENT
Start: 2023-03-08 | End: 2023-03-08 | Stop reason: HOSPADM

## 2023-03-08 RX ORDER — DICYCLOMINE HCL 20 MG
20 TABLET ORAL 2 TIMES DAILY
Qty: 20 TABLET | Refills: 0 | Status: SHIPPED | OUTPATIENT
Start: 2023-03-08

## 2023-03-08 RX ORDER — KETOROLAC TROMETHAMINE 10 MG/1
10 TABLET, FILM COATED ORAL EVERY 6 HOURS PRN
Qty: 10 TABLET | Refills: 0 | Status: SHIPPED | OUTPATIENT
Start: 2023-03-08

## 2023-03-08 RX ORDER — KETOROLAC TROMETHAMINE 30 MG/ML
15 INJECTION, SOLUTION INTRAMUSCULAR; INTRAVENOUS ONCE
Status: COMPLETED | OUTPATIENT
Start: 2023-03-08 | End: 2023-03-08

## 2023-03-08 RX ORDER — DICYCLOMINE HCL 20 MG
20 TABLET ORAL ONCE
Status: COMPLETED | OUTPATIENT
Start: 2023-03-08 | End: 2023-03-08

## 2023-03-08 RX ADMIN — DICYCLOMINE HYDROCHLORIDE 20 MG: 20 TABLET ORAL at 11:35

## 2023-03-08 RX ADMIN — IOHEXOL 100 ML: 350 INJECTION, SOLUTION INTRAVENOUS at 09:07

## 2023-03-08 RX ADMIN — SODIUM CHLORIDE 150 ML/HR: 0.9 INJECTION, SOLUTION INTRAVENOUS at 07:30

## 2023-03-08 RX ADMIN — KETOROLAC TROMETHAMINE 15 MG: 30 INJECTION, SOLUTION INTRAMUSCULAR at 11:35

## 2023-03-08 NOTE — ED PROVIDER NOTES
History  Chief Complaint   Patient presents with   • Abdominal Pain     Lower abdominal and swelling  Patient reports it has been intermittent for about a month however became severe yesterday     55-year-old female who is status post abdominoplasty for weight loss and hernia repair presents the emergency department for lower abdominal distention as well as discomfort  Patient states that she has a lot of paresthesias to her lower abdomen after surgery which they told her was normal for her  The patient notes though she has been having intermittent problems with pain and had a couple of image studies in the past however the pain is gotten much worse since around midnight  The patient's been having increasing discomfort since 5 PM yesterday  Patient is still passing gas and still moving her bowels although she notes that is not a lot but that is also not abnormal for her since her surgery  Patient denies any fever or chills  The patient states that she has mildly cloudy urine  Denies any rectal bleeding  Patient noted this morning that she needed to come to the hospital due to discomfort  Prior to Admission Medications   Prescriptions Last Dose Informant Patient Reported? Taking?    albuterol (Ventolin HFA) 90 mcg/act inhaler   No No   Sig: Inhale 2 puffs every 4 (four) hours as needed for wheezing or shortness of breath   levonorgestrel (Mirena, 52 MG,) 20 MCG/24HR IUD   Yes No   predniSONE 10 mg tablet   No No   Sig: Take 3 tabs BID X 2 days, 2 tabs BID X 2 days, 1 tab BID X 2 days, 1 tab daily X 2 days      Facility-Administered Medications: None       Past Medical History:   Diagnosis Date   • COVID     diagnoses 12/23/2021   • Migraine    • Tendonitis     right arm       Past Surgical History:   Procedure Laterality Date   • ABDOMINAL SURGERY     • ABDOMINOPLASTY N/A 3/1/2022    Procedure: CIRCUMFERENTIAL WITH REPAIR OF RECURRENT INCISIONAL HERNIA;  Surgeon: Shira Coronel MD;  Location: [address] [place] [address] OR;  Service: Plastics   • ADENOIDECTOMY     • CHOLECYSTECTOMY     • CYSTOSCOPY     • GASTROPLASTY     • HERNIA REPAIR     • PANNICULECTOMY N/A 3/1/2022    Procedure: LIPECTOMY;  Surgeon: Gallo Rao MD;  Location: [address] [place] [address] OR;  Service: Plastics   • TONSILLECTOMY     • WISDOM TOOTH EXTRACTION         History reviewed  No pertinent family history  I have reviewed and agree with the history as documented  E-Cigarette/Vaping   • E-Cigarette Use Never User      E-Cigarette/Vaping Substances     Social History     Tobacco Use   • Smoking status: Former   • Smokeless tobacco: Never   Vaping Use   • Vaping Use: Never used   Substance Use Topics   • Alcohol use: Yes     Alcohol/week: 1 0 standard drink     Types: 1 Glasses of wine per week   • Drug use: Never       Review of Systems   Constitutional: Negative for chills and fever  HENT: Negative for ear pain and sore throat  Eyes: Negative for pain and visual disturbance  Respiratory: Negative for cough and shortness of breath  Cardiovascular: Negative for chest pain and palpitations  Gastrointestinal: Positive for abdominal pain and nausea  Negative for vomiting  Genitourinary: Positive for dysuria  Negative for hematuria  Musculoskeletal: Negative for arthralgias and back pain  Skin: Negative for color change and rash  Neurological: Negative for seizures and syncope  All other systems reviewed and are negative  Physical Exam  Physical Exam  Vitals and nursing note reviewed  Constitutional:       General: She is not in acute distress  Appearance: Normal appearance  She is well-developed  HENT:      Head: Normocephalic and atraumatic  Right Ear: External ear normal       Left Ear: External ear normal       Nose: Nose normal       Mouth/Throat:      Mouth: Mucous membranes are moist    Eyes:      Conjunctiva/sclera: Conjunctivae normal    Cardiovascular:      Rate and Rhythm: Normal rate and regular rhythm        Pulses: Normal pulses  Heart sounds: Normal heart sounds  No murmur heard  Pulmonary:      Effort: Pulmonary effort is normal  No respiratory distress  Breath sounds: Normal breath sounds  Abdominal:      General: Bowel sounds are normal       Palpations: Abdomen is soft  Tenderness: There is abdominal tenderness in the periumbilical area and suprapubic area  There is no guarding or rebound  Negative signs include Joyce's sign  Musculoskeletal:         General: No swelling or deformity  Cervical back: Neck supple  Skin:     General: Skin is warm and dry  Capillary Refill: Capillary refill takes less than 2 seconds  Neurological:      General: No focal deficit present  Mental Status: She is alert and oriented to person, place, and time  Mental status is at baseline           Vital Signs  ED Triage Vitals [03/08/23 0650]   Temperature Pulse Respirations Blood Pressure SpO2   (!) 97 1 °F (36 2 °C) 85 16 166/92 98 %      Temp Source Heart Rate Source Patient Position - Orthostatic VS BP Location FiO2 (%)   Tympanic Monitor Sitting Left arm --      Pain Score       8           Vitals:    03/08/23 0838 03/08/23 0958 03/08/23 1100 03/08/23 1213   BP: 164/77 149/72 143/67 138/72   Pulse: 76 74 78 69   Patient Position - Orthostatic VS:  Lying Lying          Visual Acuity      ED Medications  Medications   iohexol (OMNIPAQUE) 350 MG/ML injection (SINGLE-DOSE) 100 mL (100 mL Intravenous Given 3/8/23 0907)   iohexol (OMNIPAQUE) 240 MG/ML solution 50 mL (50 mL Oral Given 3/8/23 0730)   ketorolac (TORADOL) injection 15 mg (15 mg Intravenous Given 3/8/23 1135)   dicyclomine (BENTYL) tablet 20 mg (20 mg Oral Given 3/8/23 1135)       Diagnostic Studies  Results Reviewed     Procedure Component Value Units Date/Time    UA w Reflex to Microscopic w Reflex to Culture [[id number]]  (Normal) Collected: 03/08/23 0813    Lab Status: Final result Specimen: Urine, Clean Catch Updated: 03/08/23 0824     Color, UA Straw Clarity, UA Clear     Specific Gravity, UA 1 010     pH, UA 6 5     Leukocytes, UA Negative     Nitrite, UA Negative     Protein, UA Negative mg/dl      Glucose, UA Negative mg/dl      Ketones, UA Negative mg/dl      Urobilinogen, UA 0 2 E U /dl      Bilirubin, UA Negative     Occult Blood, UA Negative    CBC and differential [[id number]] Collected: 03/08/23 0728    Lab Status: Final result Specimen: Blood from Arm, Right Updated: 03/08/23 0757     WBC 7 14 Thousand/uL      RBC 4 31 Million/uL      Hemoglobin 12 8 g/dL      Hematocrit 39 2 %      MCV 91 fL      MCH 29 7 pg      MCHC 32 7 g/dL      RDW 13 3 %      MPV 10 8 fL      Platelets 488 Thousands/uL      nRBC 0 /100 WBCs      Neutrophils Relative 59 %      Immat GRANS % 0 %      Lymphocytes Relative 29 %      Monocytes Relative 9 %      Eosinophils Relative 3 %      Basophils Relative 0 %      Neutrophils Absolute 4 18 Thousands/µL      Immature Grans Absolute 0 02 Thousand/uL      Lymphocytes Absolute 2 08 Thousands/µL      Monocytes Absolute 0 61 Thousand/µL      Eosinophils Absolute 0 22 Thousand/µL      Basophils Absolute 0 03 Thousands/µL     Lipase [[id number]]  (Normal) Collected: 03/08/23 0728    Lab Status: Final result Specimen: Blood from Arm, Right Updated: 03/08/23 0756     Lipase 32 u/L     Comprehensive metabolic panel [[id number]] Collected: 03/08/23 0728    Lab Status: Final result Specimen: Blood from Arm, Right Updated: 03/08/23 0756     Sodium 138 mmol/L      Potassium 3 7 mmol/L      Chloride 105 mmol/L      CO2 26 mmol/L      ANION GAP 7 mmol/L      BUN 10 mg/dL      Creatinine 0 67 mg/dL      Glucose 87 mg/dL      Calcium 8 8 mg/dL      AST 14 U/L      ALT 18 U/L      Alkaline Phosphatase 54 U/L      Total Protein 6 5 g/dL      Albumin 3 7 g/dL      Total Bilirubin 0 43 mg/dL      eGFR 108 ml/min/1 73sq m     Narrative:      [place] guidelines for Chronic Kidney Disease (CKD):   •  Stage 1 with normal or high GFR (GFR > 90 mL/min/1 73 square meters)  •  Stage 2 Mild CKD (GFR = 60-89 mL/min/1 73 square meters)  •  Stage 3A Moderate CKD (GFR = 45-59 mL/min/1 73 square meters)  •  Stage 3B Moderate CKD (GFR = 30-44 mL/min/1 73 square meters)  •  Stage 4 Severe CKD (GFR = 15-29 mL/min/1 73 square meters)  •  Stage 5 End Stage CKD (GFR <15 mL/min/1 73 square meters)  Note: GFR calculation is accurate only with a steady state creatinine    Protime-INR [[id number]]  (Normal) Collected: 03/08/23 0728    Lab Status: Final result Specimen: Blood from Arm, Right Updated: 03/08/23 0752     Protime 12 3 seconds      INR 0 92    APTT [[id number]]  (Normal) Collected: 03/08/23 0728    Lab Status: Final result Specimen: Blood from Arm, Right Updated: 03/08/23 0752     PTT 25 seconds                  CT abdomen pelvis with contrast   Final Result by Joel Mcgill MD (03/08 0920)      No acute intra-abdominal abnormality  Workstation performed: [id number]                    Procedures  Procedures         ED Course  ED Course as of 03/08/23 1525   Wed Mar 08, 2023   1135 Findings discussed with patient  No identifiable cause of the patient's abdominal pain  It is possible that her symptoms may be secondary to GI spasm  We will try the patient on Bentyl as well as some Toradol  Will refer to GI due to ongoing intermittent symptoms like this in the past                                SBIRT 22yo+    Flowsheet Row Most Recent Value   SBIRT (25 yo +)    In order to provide better care to our patients, we are screening all of our patients for alcohol and drug use  Would it be okay to ask you these screening questions? Yes Filed at: 03/08/2023 0706   Initial Alcohol Screen: US AUDIT-C     1  How often do you have a drink containing alcohol? 1 Filed at: 03/08/2023 0706   2  How many drinks containing alcohol do you have on a typical day you are drinking? 0 Filed at: 03/08/2023 0706   3a  Male UNDER 65:  How often do you have five or more drinks on one occasion? 0 Filed at: 03/08/2023 0706   3b  FEMALE Any Age, or MALE 65+: How often do you have 4 or more drinks on one occassion? 0 Filed at: 03/08/2023 0706   Audit-C Score 1 Filed at: 03/08/2023 3616   GISELLE: How many times in the past year have you    Used an illegal drug or used a prescription medication for non-medical reasons? Never Filed at: 03/08/2023 0706                    MDM    Disposition  Final diagnoses:   Lower abdominal pain     Time reflects when diagnosis was documented in both MDM as applicable and the Disposition within this note     Time User Action Codes Description Comment    3/8/2023 11:36 AM Reuben Crabtree Add [R10 30] Lower abdominal pain       ED Disposition     ED Disposition   Discharge    Condition   Stable    Date/Time   Wed Mar 8, 2023 11:40 AM    Comment   Rivka Mondragon discharge to home/self care                 Follow-up Information     Follow up With Specialties Details Why [address], DO Family Medicine On 3/13/2023  [facility] [address] [place] [address]      Ramesh Garcia MD Gastroenterology On 3/13/2023  [facility]  [phone]            Discharge Medication List as of 3/8/2023 12:03 PM      START taking these medications    Details   dicyclomine (BENTYL) 20 mg tablet Take 1 tablet (20 mg total) by mouth 2 (two) times a day, Starting Wed 3/8/2023, Normal      ketorolac (TORADOL) 10 mg tablet Take 1 tablet (10 mg total) by mouth every 6 (six) hours as needed for moderate pain, Starting Wed 3/8/2023, Normal         CONTINUE these medications which have NOT CHANGED    Details   albuterol (Ventolin HFA) 90 mcg/act inhaler Inhale 2 puffs every 4 (four) hours as needed for wheezing or shortness of breath, Starting Wed 10/12/2022, Normal      levonorgestrel (Mirena, 52 MG,) 20 MCG/24HR IUD Starting Mon 8/3/2020, Until Mon 8/3/2026, Historical Med      predniSONE 10 mg tablet Take 3 tabs BID X 2 days, 2 tabs BID X 2 days, 1 tab BID X 2 days, 1 tab daily X 2 days, Normal             No discharge procedures on file      PDMP Review     None          ED Provider  Electronically Signed by           Mckenzie Garza DO  [phone]

## 2023-03-08 NOTE — DISCHARGE INSTRUCTIONS
Return to the ER with any new, concerning, or worsening issues  Try Bentyl as prescribed for abdominal intestinal spasm which may be the cause of your pain  It may be a good idea to have an evaluation by GI for further work-up of your abdominal discomfort  Follow-up with your family doctor for repeat evaluation next week

## 2023-10-13 ENCOUNTER — OFFICE VISIT (OUTPATIENT)
Dept: URGENT CARE | Facility: CLINIC | Age: 43
End: 2023-10-13
Payer: COMMERCIAL

## 2023-10-13 ENCOUNTER — APPOINTMENT (OUTPATIENT)
Dept: RADIOLOGY | Facility: CLINIC | Age: 43
End: 2023-10-13
Payer: COMMERCIAL

## 2023-10-13 VITALS
WEIGHT: 238.4 LBS | OXYGEN SATURATION: 97 % | BODY MASS INDEX: 39.72 KG/M2 | HEIGHT: 65 IN | DIASTOLIC BLOOD PRESSURE: 78 MMHG | TEMPERATURE: 98.9 F | HEART RATE: 85 BPM | RESPIRATION RATE: 20 BRPM | SYSTOLIC BLOOD PRESSURE: 128 MMHG

## 2023-10-13 DIAGNOSIS — S69.91XA HAND INJURY, RIGHT, INITIAL ENCOUNTER: ICD-10-CM

## 2023-10-13 DIAGNOSIS — S60.011A CONTUSION OF RIGHT THUMB WITHOUT DAMAGE TO NAIL, INITIAL ENCOUNTER: Primary | ICD-10-CM

## 2023-10-13 PROCEDURE — 99213 OFFICE O/P EST LOW 20 MIN: CPT | Performed by: FAMILY MEDICINE

## 2023-10-13 PROCEDURE — 73130 X-RAY EXAM OF HAND: CPT

## 2023-10-13 NOTE — PROGRESS NOTES
[facility]    NAME: Merlin Iron is a 37 y.o. female  : 1980    MRN: [id number]  DATE: 2023  TIME: 6:06 PM    Assessment and Plan   Contusion of right thumb without damage to nail, initial encounter [S60.011A]  1. Contusion of right thumb without damage to nail, initial encounter  XR hand 3+ vw right      Thumb-o-prene    Patient Instructions     Patient Instructions   Xray appears negative for any fracture. Will follow up with radiologist report when available. Recommend elevating body part, icing the area every 2 hours for 20-30 minutes, take Ibuprofen every 6-8 hours to reduce inflammation. If not improving over the next week, follow up with PCP or orthopedics. Chief Complaint     Chief Complaint   Patient presents with    Hand Pain     Pt tried to catch an object and it hit the palm area by thumb. Object hit very hard and caused some bruising and swelling around her thumb and thumb area. She is having trouble bending her thumb and that is causing her pain. Ibuprofen taken this morning for pain. History of Present Illness   37year old female caught a heavy object that fell from a height last night. Has bruising, swelling right thenar area. Has difficulty moving her thumb. Review of Systems   Review of Systems   Constitutional:  Negative for chills and fever. Respiratory:  Negative for cough and shortness of breath. Cardiovascular:  Negative for chest pain.    Musculoskeletal:         Right thumb pain, swelling, bruising       Current Medications     Current Outpatient Medications:     levonorgestrel (Mirena, 52 MG,) 20 MCG/24HR IUD, , Disp: , Rfl:     albuterol (Ventolin HFA) 90 mcg/act inhaler, Inhale 2 puffs every 4 (four) hours as needed for wheezing or shortness of breath (Patient not taking: Reported on 10/13/2023), Disp: 18 g, Rfl: 0    dicyclomine (BENTYL) 20 mg tablet, Take 1 tablet (20 mg total) by mouth 2 (two) times a day (Patient not taking: Reported on 10/13/2023), Disp: 20 tablet, Rfl: 0    ketorolac (TORADOL) 10 mg tablet, Take 1 tablet (10 mg total) by mouth every 6 (six) hours as needed for moderate pain (Patient not taking: Reported on 10/13/2023), Disp: 10 tablet, Rfl: 0    predniSONE 10 mg tablet, Take 3 tabs BID X 2 days, 2 tabs BID X 2 days, 1 tab BID X 2 days, 1 tab daily X 2 days (Patient not taking: Reported on 10/13/2023), Disp: 26 tablet, Rfl: 0    Current Allergies     Allergies as of 10/13/2023 - Reviewed 10/13/2023   Allergen Reaction Noted    Fentanyl Itching 11/17/2020    Hydromorphone Rash 11/19/2020    Morphine Itching 11/17/2020          The following portions of the patient's history were reviewed and updated as appropriate: allergies, current medications, past family history, past medical history, past social history, past surgical history and problem list.   Past Medical History:   Diagnosis Date    COVID     diagnoses 12/23/2021    Migraine     Tendonitis     right arm     Past Surgical History:   Procedure Laterality Date    ABDOMINAL SURGERY      ABDOMINOPLASTY N/A 3/1/2022    Procedure: CIRCUMFERENTIAL WITH REPAIR OF RECURRENT INCISIONAL HERNIA;  Surgeon: Konstantin Sexton MD;  Location: [address] [place] [address];  Service: Plastics    ADENOIDECTOMY      CHOLECYSTECTOMY      CYSTOSCOPY      GASTROPLASTY      HERNIA REPAIR      PANNICULECTOMY N/A 3/1/2022    Procedure: LIPECTOMY;  Surgeon: Konstantin Sexton MD;  Location: [address] [place] [address];  Service: Plastics    TONSILLECTOMY      WISDOM TOOTH EXTRACTION       No family history on file. Social History     Socioeconomic History    Marital status: Single     Spouse name: Not on file    Number of children: Not on file    Years of education: Not on file    Highest education level: Not on file   Occupational History    Not on file   Tobacco Use    Smoking status: Former    Smokeless tobacco: Never   Vaping Use    Vaping Use: Never used   Substance and Sexual Activity    Alcohol use:  Yes Alcohol/week: 1.0 standard drink of alcohol     Types: 1 Glasses of wine per week     Comment: occassional    Drug use: Never    Sexual activity: Yes     Partners: Male   Other Topics Concern    Not on file   Social History Narrative    Not on file     Social Determinants of Health     Financial Resource Strain: Not on file   Food Insecurity: Not on file   Transportation Needs: Not on file   Physical Activity: Not on file   Stress: Not on file   Social Connections: Not on file   Intimate Partner Violence: Not on file   Housing Stability: Not on file     Medications have been verified. Objective   /78   Pulse 85   Temp 98.9 °F (37.2 °C)   Resp 20   Ht 5' 5" (1.651 m)   Wt 108 kg (238 lb 6.4 oz)   SpO2 97%   BMI 39.67 kg/m²      Physical Exam   Physical Exam  Vitals and nursing note reviewed. Constitutional:       Appearance: Normal appearance. Cardiovascular:      Rate and Rhythm: Normal rate and regular rhythm. Pulses: Normal pulses. Pulmonary:      Effort: Pulmonary effort is normal.      Breath sounds: Normal breath sounds. Musculoskeletal:      Right hand: Swelling (thenar area with ecchymosis, TTP near MCP joint right thumb) and tenderness present. Decreased range of motion. Decreased strength of finger abduction and thumb/finger opposition. Normal capillary refill. Normal pulse. Neurological:      Mental Status: She is alert.

## 2024-04-22 ENCOUNTER — APPOINTMENT (EMERGENCY)
Dept: CT IMAGING | Facility: HOSPITAL | Age: 44
End: 2024-04-22
Payer: COMMERCIAL

## 2024-04-22 ENCOUNTER — HOSPITAL ENCOUNTER (EMERGENCY)
Facility: HOSPITAL | Age: 44
Discharge: HOME | End: 2024-04-22
Attending: EMERGENCY MEDICINE
Payer: COMMERCIAL

## 2024-04-22 VITALS
OXYGEN SATURATION: 99 % | RESPIRATION RATE: 18 BRPM | HEART RATE: 70 BPM | DIASTOLIC BLOOD PRESSURE: 81 MMHG | SYSTOLIC BLOOD PRESSURE: 173 MMHG | TEMPERATURE: 97.8 F

## 2024-04-22 DIAGNOSIS — M54.9 MUSCULOSKELETAL BACK PAIN: ICD-10-CM

## 2024-04-22 DIAGNOSIS — R10.9 ABDOMINAL PAIN: Primary | ICD-10-CM

## 2024-04-22 DIAGNOSIS — N83.202 LEFT OVARIAN CYST: ICD-10-CM

## 2024-04-22 LAB
ALBUMIN SERPL BCP-MCNC: 3.8 G/DL (ref 3.5–5)
ALP SERPL-CCNC: 47 U/L (ref 34–104)
ALT SERPL W P-5'-P-CCNC: 28 U/L (ref 7–52)
ANION GAP SERPL CALCULATED.3IONS-SCNC: 7 MMOL/L (ref 4–13)
AST SERPL W P-5'-P-CCNC: 20 U/L (ref 13–39)
BACTERIA UR QL AUTO: ABNORMAL /HPF
BASOPHILS # BLD MANUAL: 0 THOUSAND/UL (ref 0–0.1)
BASOPHILS NFR MAR MANUAL: 0 % (ref 0–1)
BILIRUB SERPL-MCNC: 0.38 MG/DL (ref 0.2–1)
BILIRUB UR QL STRIP: NEGATIVE
BUN SERPL-MCNC: 7 MG/DL (ref 5–25)
CALCIUM SERPL-MCNC: 8.5 MG/DL (ref 8.4–10.2)
CHLORIDE SERPL-SCNC: 107 MMOL/L (ref 96–108)
CLARITY UR: CLEAR
CO2 SERPL-SCNC: 24 MMOL/L (ref 21–32)
COLOR UR: YELLOW
CREAT SERPL-MCNC: 0.65 MG/DL (ref 0.6–1.3)
EOSINOPHIL # BLD MANUAL: 0.17 THOUSAND/UL (ref 0–0.4)
EOSINOPHIL NFR BLD MANUAL: 2 % (ref 0–6)
ERYTHROCYTE [DISTWIDTH] IN BLOOD BY AUTOMATED COUNT: 13.6 % (ref 11.6–15.1)
GFR SERPL CREATININE-BSD FRML MDRD: 108 ML/MIN/1.73SQ M
GLUCOSE SERPL-MCNC: 82 MG/DL (ref 65–140)
GLUCOSE UR STRIP-MCNC: NEGATIVE MG/DL
HCG SERPL QL: NEGATIVE
HCT VFR BLD AUTO: 42.2 % (ref 34.8–46.1)
HGB BLD-MCNC: 13.9 G/DL (ref 11.5–15.4)
HGB UR QL STRIP.AUTO: ABNORMAL
KETONES UR STRIP-MCNC: NEGATIVE MG/DL
LEUKOCYTE ESTERASE UR QL STRIP: ABNORMAL
LIPASE SERPL-CCNC: 24 U/L (ref 11–82)
LYMPHOCYTES # BLD AUTO: 3.06 THOUSAND/UL (ref 0.6–4.47)
LYMPHOCYTES # BLD AUTO: 30 % (ref 14–44)
MCH RBC QN AUTO: 30.1 PG (ref 26.8–34.3)
MCHC RBC AUTO-ENTMCNC: 32.9 G/DL (ref 31.4–37.4)
MCV RBC AUTO: 91 FL (ref 82–98)
MONOCYTES # BLD AUTO: 0.52 THOUSAND/UL (ref 0–1.22)
MONOCYTES NFR BLD: 6 % (ref 4–12)
MUCOUS THREADS UR QL AUTO: ABNORMAL
NEUTROPHILS # BLD MANUAL: 4.98 THOUSAND/UL (ref 1.85–7.62)
NEUTS BAND NFR BLD MANUAL: 2 % (ref 0–8)
NEUTS SEG NFR BLD AUTO: 55 % (ref 43–75)
NITRITE UR QL STRIP: NEGATIVE
NON-SQ EPI CELLS URNS QL MICRO: ABNORMAL /HPF
PH UR STRIP.AUTO: 7 [PH]
PLATELET # BLD AUTO: 276 THOUSANDS/UL (ref 149–390)
PLATELET BLD QL SMEAR: ADEQUATE
PMV BLD AUTO: 10 FL (ref 8.9–12.7)
POTASSIUM SERPL-SCNC: 3.8 MMOL/L (ref 3.5–5.3)
PROT SERPL-MCNC: 6.6 G/DL (ref 6.4–8.4)
PROT UR STRIP-MCNC: NEGATIVE MG/DL
RBC # BLD AUTO: 4.62 MILLION/UL (ref 3.81–5.12)
RBC #/AREA URNS AUTO: ABNORMAL /HPF
RBC MORPH BLD: NORMAL
SODIUM SERPL-SCNC: 138 MMOL/L (ref 135–147)
SP GR UR STRIP.AUTO: 1.02
UROBILINOGEN UR QL STRIP.AUTO: 0.2 E.U./DL
VARIANT LYMPHS # BLD AUTO: 5 %
WBC # BLD AUTO: 8.73 THOUSAND/UL (ref 4.31–10.16)
WBC #/AREA URNS AUTO: ABNORMAL /HPF

## 2024-04-22 PROCEDURE — 83690 ASSAY OF LIPASE: CPT

## 2024-04-22 PROCEDURE — 81001 URINALYSIS AUTO W/SCOPE: CPT

## 2024-04-22 PROCEDURE — 84703 CHORIONIC GONADOTROPIN ASSAY: CPT

## 2024-04-22 PROCEDURE — 99285 EMERGENCY DEPT VISIT HI MDM: CPT

## 2024-04-22 PROCEDURE — 85007 BL SMEAR W/DIFF WBC COUNT: CPT

## 2024-04-22 PROCEDURE — 99284 EMERGENCY DEPT VISIT MOD MDM: CPT

## 2024-04-22 PROCEDURE — 74177 CT ABD & PELVIS W/CONTRAST: CPT

## 2024-04-22 PROCEDURE — 36415 COLL VENOUS BLD VENIPUNCTURE: CPT

## 2024-04-22 PROCEDURE — 96374 THER/PROPH/DIAG INJ IV PUSH: CPT

## 2024-04-22 PROCEDURE — 85027 COMPLETE CBC AUTOMATED: CPT

## 2024-04-22 PROCEDURE — 80053 COMPREHEN METABOLIC PANEL: CPT

## 2024-04-22 RX ORDER — KETOROLAC TROMETHAMINE 30 MG/ML
15 INJECTION, SOLUTION INTRAMUSCULAR; INTRAVENOUS ONCE
Status: COMPLETED | OUTPATIENT
Start: 2024-04-22 | End: 2024-04-22

## 2024-04-22 RX ADMIN — KETOROLAC TROMETHAMINE 15 MG: 30 INJECTION, SOLUTION INTRAMUSCULAR; INTRAVENOUS at 19:14

## 2024-04-22 RX ADMIN — IOHEXOL 100 ML: 350 INJECTION, SOLUTION INTRAVENOUS at 19:51

## 2024-04-22 NOTE — Clinical Note
Radha Velarde was seen and treated in our emergency department on 4/22/2024.    No restrictions    ?    ?    Diagnosis: Abdominal pain    Radha  may return to work on return date.    She may return on this date: ?    Patient may return to work after she sees OBGYN on Wednesday morning.      If you have any questions or concerns, please don't hesitate to call.      Reese Fernando PA-C    ______________________________           _______________          _______________  Hospital Representative                              Date                                Time

## 2024-04-22 NOTE — ED ATTENDING ATTESTATION
4/22/2024  I, Luisito Crabtree Jr, DO, saw and evaluated the patient. I have discussed the patient with the resident/non-physician practitioner and agree with the resident's/non-physician practitioner's findings, Plan of Care, and MDM as documented in the resident's/non-physician practitioner's note, except where noted. All available labs and Radiology studies were reviewed.  I was present for key portions of any procedure(s) performed by the resident/non-physician practitioner and I was immediately available to provide assistance.       At this point I agree with the current assessment done in the Emergency Department.  I have conducted an independent evaluation of this patient a history and physical is as follows:    Patient is a 44-year-old female presents secondary to pain which is more of a pressure actually in the left lower and left back.  The patient notes that earlier today she thought that she had a hernia because her abdomen was protruding while she was laying on her back.  The patient has had a history of a hernia repair and what sounds like a panniculectomy in the past.  Patient was concerned that maybe she has hernia although she is not having any fever or vomiting.  The patient's abdomen is minimally tender to palpation and more is a fullness sensation to the patient and is negative for significant pain.  Will get a CT scan as well as lab work as the patient is concerned about hernia.    CT scan is nonacute at this time with exception of the left ovarian cyst.  The patient is not appear to have any signs of torsion as the patient is in no significant distress at this time.  The patient's symptoms may be from a hernia and this hernia may be self reduced as the patient notes that she pushed on her abdomen to put a distention of her abdomen back in its place.  The patient was recommended to follow-up with a surgeon for further evaluation of possible ventral hernia as outpatient.    Patient was told to  return for new or concerning issues.    ED Course         Critical Care Time  Procedures

## 2024-04-22 NOTE — ED PROVIDER NOTES
History  Chief Complaint   Patient presents with    Back Pain     Patient presents with left sided lower back pain radiating down her leg. Reports use of tylenol with no relief. No loss of bladder or bowel. Reports she did not strain her back      Patient is a 44-year-old female with relevant past medical history of COVID, migraine, vaginal bleeding, migraine, lower abdominal pain, abdominoplasty, and hernia repair presenting with abdominal pain x 2 days. She reports having a normal day yesterday with no strenuous activities. She started with pain above her left hip bone around 8 PM. This pain transitioned to her left lower back and left lower quadrant today. She reports the pain was so severe that she could barely get out of bed. The pain is better sitting up. She complains of 6/10 pressure located in between her LLQ and left paravertebral vasculature. No back pain red flag symptoms. The abd pain is worse with movement. She took Tylenol earlier with minimal relief. She thinks she had a ventral hernia earlier today near her incision site that she spontaneously reduced. She had mesh placed in 2017 for her hernia. She later had an abdominoplasty performed in 2022. She had 3 episodes of loose watery diarrhea yesterday. Patient denies fever, chills, headache, dizziness, lightheadedness, weakness, visual changes, chest pain, shortness of breath, nausea, vomiting, constipation, dysuria, or other urinary symptoms. Patient presents with her boyfriend.      History provided by:  Patient and significant other   used: No        Prior to Admission Medications   Prescriptions Last Dose Informant Patient Reported? Taking?   albuterol (Ventolin HFA) 90 mcg/act inhaler   No No   Sig: Inhale 2 puffs every 4 (four) hours as needed for wheezing or shortness of breath   Patient not taking: Reported on 10/13/2023   dicyclomine (BENTYL) 20 mg tablet   No No   Sig: Take 1 tablet (20 mg total) by mouth 2 (two) times a  day   Patient not taking: Reported on 10/13/2023   ketorolac (TORADOL) 10 mg tablet   No No   Sig: Take 1 tablet (10 mg total) by mouth every 6 (six) hours as needed for moderate pain   Patient not taking: Reported on 10/13/2023   levonorgestrel (Mirena, 52 MG,) 20 MCG/24HR IUD   Yes No   predniSONE 10 mg tablet   No No   Sig: Take 3 tabs BID X 2 days, 2 tabs BID X 2 days, 1 tab BID X 2 days, 1 tab daily X 2 days   Patient not taking: Reported on 10/13/2023      Facility-Administered Medications: None       Past Medical History:   Diagnosis Date    COVID     diagnoses 12/23/2021    Migraine     Tendonitis     right arm       Past Surgical History:   Procedure Laterality Date    ABDOMINAL SURGERY      ABDOMINOPLASTY N/A 3/1/2022    Procedure: CIRCUMFERENTIAL WITH REPAIR OF RECURRENT INCISIONAL HERNIA;  Surgeon: Paramjit Adrian MD;  Location: [facility];  Service: Plastics    ADENOIDECTOMY      CHOLECYSTECTOMY      CYSTOSCOPY      GASTROPLASTY      HERNIA REPAIR      PANNICULECTOMY N/A 3/1/2022    Procedure: LIPECTOMY;  Surgeon: Paramjit Adrian MD;  Location: [facility];  Service: Plastics    TONSILLECTOMY      WISDOM TOOTH EXTRACTION         History reviewed. No pertinent family history.  I have reviewed and agree with the history as documented.    E-Cigarette/Vaping    E-Cigarette Use Never User      E-Cigarette/Vaping Substances     Social History     Tobacco Use    Smoking status: Former    Smokeless tobacco: Never   Vaping Use    Vaping status: Never Used   Substance Use Topics    Alcohol use: Yes     Alcohol/week: 1.0 standard drink of alcohol     Types: 1 Glasses of wine per week     Comment: occassional    Drug use: Never       Review of Systems   Constitutional:  Positive for fatigue. Negative for chills and fever.   HENT:  Negative for congestion, ear pain, rhinorrhea and sore throat.    Eyes:  Negative for pain and visual disturbance.   Respiratory:  Negative for cough and shortness of breath.     Cardiovascular:  Negative for chest pain and palpitations.   Gastrointestinal:  Positive for abdominal pain and diarrhea. Negative for constipation, nausea and vomiting.   Genitourinary:  Negative for dysuria, frequency, hematuria and urgency.   Musculoskeletal:  Positive for back pain. Negative for arthralgias.   Skin:  Negative for color change and rash.   Neurological:  Negative for dizziness, seizures, syncope, weakness, light-headedness and headaches.   Psychiatric/Behavioral:  Negative for agitation and confusion.        Physical Exam  Physical Exam  Vitals and nursing note reviewed.   Constitutional:       General: She is not in acute distress.     Appearance: She is well-developed. She is not ill-appearing.   HENT:      Head: Normocephalic and atraumatic.   Eyes:      Conjunctiva/sclera: Conjunctivae normal.   Cardiovascular:      Rate and Rhythm: Normal rate and regular rhythm.      Heart sounds: No murmur heard.  Pulmonary:      Effort: Pulmonary effort is normal. No respiratory distress.      Breath sounds: Normal breath sounds. No wheezing, rhonchi or rales.   Abdominal:      Palpations: Abdomen is soft.      Tenderness: There is abdominal tenderness in the left lower quadrant. There is no guarding or rebound.   Musculoskeletal:         General: No swelling.      Cervical back: Normal and neck supple.      Thoracic back: Normal.      Lumbar back: Normal. No swelling.   Skin:     General: Skin is warm and dry.      Capillary Refill: Capillary refill takes less than 2 seconds.   Neurological:      General: No focal deficit present.      Mental Status: She is alert and oriented to person, place, and time.   Psychiatric:         Mood and Affect: Mood normal.         Vital Signs  ED Triage Vitals [04/22/24 1822]   Temperature Pulse Respirations Blood Pressure SpO2   97.8 °F (36.6 °C) 70 18 (!) 173/81 99 %      Temp Source Heart Rate Source Patient Position - Orthostatic VS BP Location FiO2 (%)   Temporal --  -- -- --      Pain Score       6           Vitals:    04/22/24 1822   BP: (!) 173/81   Pulse: 70         Visual Acuity      ED Medications  Medications   ketorolac (TORADOL) injection 15 mg (15 mg Intravenous Given 4/22/24 1914)   iohexol (OMNIPAQUE) 350 MG/ML injection (MULTI-DOSE) 100 mL (100 mL Intravenous Given 4/22/24 1951)       Diagnostic Studies  Results Reviewed       Procedure Component Value Units Date/Time    RBC Morphology Reflex Test [[id number]] Collected: 04/22/24 1909    Lab Status: Final result Specimen: Blood from Arm, Right Updated: 04/22/24 2002    CBC and differential [[id number]]  (Normal) Collected: 04/22/24 1909    Lab Status: Final result Specimen: Blood from Arm, Right Updated: 04/22/24 1947     WBC 8.73 Thousand/uL      RBC 4.62 Million/uL      Hemoglobin 13.9 g/dL      Hematocrit 42.2 %      MCV 91 fL      MCH 30.1 pg      MCHC 32.9 g/dL      RDW 13.6 %      MPV 10.0 fL      Platelets 276 Thousands/uL     Narrative:      This is an appended report.  These results have been appended to a previously verified report.    Manual Differential(PHLEBS Do Not Order) [[id number]]  (Abnormal) Collected: 04/22/24 1909    Lab Status: Final result Specimen: Blood from Arm, Right Updated: 04/22/24 1947     Segmented % 55 %      Bands % 2 %      Lymphocytes % 30 %      Monocytes % 6 %      Eosinophils % 2 %      Basophils % 0 %      Atypical Lymphocytes % 5 %      Absolute Neutrophils 4.98 Thousand/uL      Absolute Lymphocytes 3.06 Thousand/uL      Absolute Monocytes 0.52 Thousand/uL      Absolute Eosinophils 0.17 Thousand/uL      Absolute Basophils 0.00 Thousand/uL      Total Counted --     RBC Morphology Normal     Platelet Estimate Adequate    hCG, qualitative pregnancy [[id number]]  (Normal) Collected: 04/22/24 1909    Lab Status: Final result Specimen: Blood from Arm, Right Updated: 04/22/24 1937     Preg, Serum Negative    Urine Microscopic [[id number]]  (Abnormal) Collected: 04/22/24 1914    Lab  Status: Final result Specimen: Urine, Clean Catch Updated: 04/22/24 1937     RBC, UA 0-1 /hpf      WBC, UA 1-2 /hpf      Epithelial Cells Occasional /hpf      Bacteria, UA Moderate /hpf      MUCUS THREADS Occasional    CMP [[id number]] Collected: 04/22/24 1909    Lab Status: Final result Specimen: Blood from Arm, Right Updated: 04/22/24 1933     Sodium 138 mmol/L      Potassium 3.8 mmol/L      Chloride 107 mmol/L      CO2 24 mmol/L      ANION GAP 7 mmol/L      BUN 7 mg/dL      Creatinine 0.65 mg/dL      Glucose 82 mg/dL      Calcium 8.5 mg/dL      AST 20 U/L      ALT 28 U/L      Alkaline Phosphatase 47 U/L      Total Protein 6.6 g/dL      Albumin 3.8 g/dL      Total Bilirubin 0.38 mg/dL      eGFR 108 ml/min/1.73sq m     Narrative:      National Kidney Disease Foundation guidelines for Chronic Kidney Disease (CKD):     Stage 1 with normal or high GFR (GFR > 90 mL/min/1.73 square meters)    Stage 2 Mild CKD (GFR = 60-89 mL/min/1.73 square meters)    Stage 3A Moderate CKD (GFR = 45-59 mL/min/1.73 square meters)    Stage 3B Moderate CKD (GFR = 30-44 mL/min/1.73 square meters)    Stage 4 Severe CKD (GFR = 15-29 mL/min/1.73 square meters)    Stage 5 End Stage CKD (GFR <15 mL/min/1.73 square meters)  Note: GFR calculation is accurate only with a steady state creatinine    Lipase [[id number]]  (Normal) Collected: 04/22/24 1909    Lab Status: Final result Specimen: Blood from Arm, Right Updated: 04/22/24 1933     Lipase 24 u/L     UA w Reflex to Microscopic w Reflex to Culture [[id number]]  (Abnormal) Collected: 04/22/24 1914    Lab Status: Final result Specimen: Urine, Clean Catch Updated: 04/22/24 1921     Color, UA Yellow     Clarity, UA Clear     Specific Gravity, UA 1.020     pH, UA 7.0     Leukocytes, UA Trace     Nitrite, UA Negative     Protein, UA Negative mg/dl      Glucose, UA Negative mg/dl      Ketones, UA Negative mg/dl      Urobilinogen, UA 0.2 E.U./dl      Bilirubin, UA Negative     Occult Blood, UA 2+                    CT Abdomen pelvis with contrast   Final Result by Moreno Rothman MD (04/22 2056)      No acute inflammatory process.      Simple left ovarian cyst measuring 3.7 cm.         Workstation performed: [id number]                    Procedures  Procedures         ED Course  ED Course as of 04/22/24 2128 Mon Apr 22, 2024 1824 Vital signs reviewed and within normal limits other than elevated blood pressure.   1920 CBC and differential  No leukocytosis, anemia, or platelet abnormality.   1926 Blood, UA(!): 2+  Hematuria noted.   1942 PREGNANCY, SERUM: Negative   1942 LIPASE: 24  Lipase WNL.   1942 CMP  Electrolytes within normal limits. No CHERELLE or glucose abnormality. No transaminitis.   2027 Went over results thus far with patient. The Toradol helped her pain. She once again denies UTI symptoms.   2100 CT Abdomen pelvis with contrast  IMPRESSION:  No acute inflammatory process.  Simple left ovarian cyst measuring 3.7 cm.   2100 Went over results with patient. She is happy to hear her results and will manage her pain from home. She has a follow-up appointment with her OB/GYN on Wednesday where she will discuss her CT findings. Will discharge home with strict return precautions. Provided CT findings on discharge papers at her request.                               SBIRT 22yo+      Flowsheet Row Most Recent Value   Initial Alcohol Screen: US AUDIT-C     1. How often do you have a drink containing alcohol? 0 Filed at: 04/22/2024 1822   2. How many drinks containing alcohol do you have on a typical day you are drinking?  0 Filed at: 04/22/2024 1822   3a. Male UNDER 65: How often do you have five or more drinks on one occasion? 0 Filed at: 04/22/2024 1822   3b. FEMALE Any Age, or MALE 65+: How often do you have 4 or more drinks on one occassion? 0 Filed at: 04/22/2024 1822   Audit-C Score 0 Filed at: 04/22/2024 1822   GISELLE: How many times in the past year have you...    Used an illegal drug or used a prescription  medication for non-medical reasons? Never Filed at: 04/22/2024 1822                      Medical Decision Making  Patient is a 44-year-old female with relevant past medical history of COVID, migraine, vaginal bleeding, migraine, lower abdominal pain,  abdominoplasty, and hernia repair presenting with abdominal pain x 2 days. Patient is nontoxic-appearing and has tenderness in her left lower quadrant. Patient wants to ensure she does not have a new hernia.  Abdominal labs due to left lower quadrant pain and tenderness. CT abdomen/pelvis with contrast for the latter and due to extensive abdominal surgery history.  See ED course for interpretation of labs, imaging, and further medical decision making.   Toradol 15 mg for her pain.   Patient will follow-up with her OB/GYN on Wednesday as scheduled to discuss her CT findings of ovarian cyst. Provided ambulatory referral for general surgery as she had several questions about her prior abdominal surgeries and mesh location. Provided supportive care instructions for managing her abdominal pain and musculoskeletal back pain at home.  Dispo: Patient discharged home with strict return precautions. Advised patient to return to the nearest emergency room if she has new or worsening symptoms. Advised patient to follow-up with her family doctor. Patient and boyfriend are satisfied with care and agree with management and plan.     Amount and/or Complexity of Data Reviewed  External Data Reviewed: labs, radiology and notes.  Labs: ordered. Decision-making details documented in ED Course.  Radiology: ordered. Decision-making details documented in ED Course.    Risk  Prescription drug management.             Disposition  Final diagnoses:   Abdominal pain   Musculoskeletal back pain   Left ovarian cyst     Time reflects when diagnosis was documented in both MDM as applicable and the Disposition within this note       Time User Action Codes Description Comment    4/22/2024  8:59 PM  Reese Fernando Add [R10.9] Abdominal pain     4/22/2024  9:01 PM Reese Fernando Add [M54.9] Musculoskeletal back pain     4/22/2024  9:08 PM Luisito Crabtree Add [N83.202] Left ovarian cyst           ED Disposition       ED Disposition   Discharge    Condition   Stable    Date/Time   Mon Apr 22, 2024 2101    Comment   Radha Velarde discharge to home/self care.                   Follow-up Information       Follow up With Specialties Details Why Contact Info Additional Information    [facility] Emergency Department Emergency Medicine Go to  If symptoms worsen [address] [facility] [id number] [phone] [facility] Emergency Department, [address] [place], [place] [address]    Kira Henderson DO Family Medicine Schedule an appointment as soon as possible for a visit  As needed [address] [facility] [address] [phone]       [facility] General Surgery Schedule an appointment as soon as possible for a visit   [address] [facility] [address] [phone] [address], [address] [phone]            Patient's Medications   Discharge Prescriptions    No medications on file           PDMP Review       None            ED Provider  Electronically Signed by             Reese Fernando PA-C  04/22/24 2128

## 2024-04-23 RX ORDER — HYDROCHLOROTHIAZIDE 25 MG/1
25 TABLET ORAL DAILY
COMMUNITY
Start: 2024-01-15

## 2024-04-23 RX ORDER — RIMEGEPANT SULFATE 75 MG/75MG
TABLET, ORALLY DISINTEGRATING ORAL
COMMUNITY

## 2024-04-23 RX ORDER — MEGESTROL ACETATE 40 MG/1
40 TABLET ORAL 2 TIMES DAILY
COMMUNITY
Start: 2024-03-11 | End: 2025-03-11

## 2024-04-23 RX ORDER — ACETAMINOPHEN 500 MG
500 TABLET ORAL
COMMUNITY

## 2024-04-23 NOTE — PROGRESS NOTES
H&P Exam - General Surgery   Radha Velarde 44 y.o. female MRN: [id number]   Encounter: [id number]    Assessment/Plan    Abdominal pain  Pleasant 44-year-old female with complex surgical history including recurrent ventral hernia repair possibly with mesh as well as abdominoplasty in 2022 presenting for evaluation of sudden onset acute cramping left lower quadrant abdominal pain with radiation into her back starting 4 days ago and persisting.  Was evaluated in the ER on Monday where a CT suggested no acute inflammatory changes to explain her symptoms.  Blood work at the time reported to be normal.  No other significant associated symptoms.  On examination today she has a well-healed vertical midline and crescent-shaped lower abdominal scar.  No open wounds nor palpable seromas.  Potential weakness noted at the umbilicus, when examining this area she did report that yesterday she did notice protrusion in this area that she had to manually reduce that she was concerned about a hernia.  No bulging reported in the left or right groin.  On examination difficult to confidently identify a hernia at this location of her surgically created umbilicus.  No evidence for inguinal or femoral hernias.  Palpation of the low left back suggest no acute abnormality to explain her symptoms.  After full history and physical I suspect 2 different pathologies may be playing a role including potentially an occult recurrent incisional hernia of the midline that I would like to confirm with abdominal wall ultrasound where Valsalva can be used.  Additionally with her radiating back pain suggestive of a radiculopathy I would like to obtain thoraco lumbar x-rays to evaluate for degenerative spinal disease.  Additionally made recommendation for her to seek out follow-up with her existing [facility] plastic surgeon at [place] for second opinion regarding her symptoms.  Will ask her to return to the office following testing for evaluation  by Dr. Spears for second opinion regarding potential recurrent incisional hernia.  Of note patient had been told that previous mesh was removed however a CT scan suggesting mesh present and intact at this time.  Additionally we will try to request records of her prior surgery.  All questions answered to her understanding and satisfaction, agreeable to call or come in sooner if symptoms progress or change in any way, follow-up arranged.    Addendum 4/29: Op note reviewed. At time of abdominoplasty a supraumbilical hernia was noted with free floating mesh. Mesh/hernia appears to have been reduced (mesh not removed) and then defect closed with figure of eight suture of Ethibond.  Diagnoses and all orders for this visit:    Radiculopathy  -     XR spine thoracolumbar 2 vw; Future    Abdominal pain  -     Ambulatory Referral to General Surgery  -     US abdominal wall; Future    History of incisional hernia repair  -     US abdominal wall; Future    Back pain  -     XR spine thoracolumbar 2 vw; Future    Other orders  -     Nurtec 75 MG TBDP; DISSOLVE 1 TABLET BY MOUTH ONE TIME AS NEEDED FOR MIGRAINE  -     megestrol (MEGACE) 40 mg tablet; Take 40 mg by mouth 2 (two) times a day  -     hydroCHLOROthiazide 25 mg tablet; Take 25 mg by mouth daily  -     acetaminophen (TYLENOL) 500 mg tablet; Take 500 mg by mouth        History of Present Illness   Chief Complaint   Patient presents with    Consult     abdominal pain, hx of abdominal hernia surgery     Patient is here for LLQ sharp/aching pain and tenderness that radiates to her lower lower back that started Sunday with diarrhea. The diarrhea has subsided but the the pain continues. Patient denies issues with urination, nausea/vomiting, trouble eating/drinking/ swallowing or fevers/chills. Lei.O,MA    S/p lipectomy with recurrent inc hernia rpr 03/01/22              Review of Systems   All other systems reviewed and are negative.      Historical Information   Past  Medical History:   Diagnosis Date    COVID     diagnoses 12/23/2021    Migraine     Tendonitis     right arm     Past Surgical History:   Procedure Laterality Date    ABDOMINAL SURGERY      ABDOMINOPLASTY N/A 3/1/2022    Procedure: CIRCUMFERENTIAL WITH REPAIR OF RECURRENT INCISIONAL HERNIA;  Surgeon: Paramjit Adrian MD;  Location:  MAIN OR;  Service: Plastics    ADENOIDECTOMY      CHOLECYSTECTOMY      CYSTOSCOPY      GASTROPLASTY      HERNIA REPAIR      PANNICULECTOMY N/A 3/1/2022    Procedure: LIPECTOMY;  Surgeon: Paramjit Adrian MD;  Location:  MAIN OR;  Service: Plastics    TONSILLECTOMY      WISDOM TOOTH EXTRACTION       Social History   Social History     Substance and Sexual Activity   Alcohol Use Yes    Alcohol/week: 1.0 standard drink of alcohol    Types: 1 Glasses of wine per week    Comment: occassional     Social History     Substance and Sexual Activity   Drug Use Never     Social History     Tobacco Use   Smoking Status Former   Smokeless Tobacco Never     History reviewed. No pertinent family history.    Meds/Allergies     Current Outpatient Medications:     acetaminophen (TYLENOL) 500 mg tablet, Take 500 mg by mouth, Disp: , Rfl:     hydroCHLOROthiazide 25 mg tablet, Take 25 mg by mouth daily, Disp: , Rfl:     levonorgestrel (Mirena, 52 MG,) 20 MCG/24HR IUD, , Disp: , Rfl:     megestrol (MEGACE) 40 mg tablet, Take 40 mg by mouth 2 (two) times a day, Disp: , Rfl:     Nurtec 75 MG TBDP, DISSOLVE 1 TABLET BY MOUTH ONE TIME AS NEEDED FOR MIGRAINE, Disp: , Rfl:     albuterol (Ventolin HFA) 90 mcg/act inhaler, Inhale 2 puffs every 4 (four) hours as needed for wheezing or shortness of breath (Patient not taking: Reported on 10/13/2023), Disp: 18 g, Rfl: 0    dicyclomine (BENTYL) 20 mg tablet, Take 1 tablet (20 mg total) by mouth 2 (two) times a day (Patient not taking: Reported on 10/13/2023), Disp: 20 tablet, Rfl: 0    ketorolac (TORADOL) 10 mg tablet, Take 1 tablet (10 mg total) by mouth every 6 (six)  "hours as needed for moderate pain (Patient not taking: Reported on 10/13/2023), Disp: 10 tablet, Rfl: 0    predniSONE 10 mg tablet, Take 3 tabs BID X 2 days, 2 tabs BID X 2 days, 1 tab BID X 2 days, 1 tab daily X 2 days (Patient not taking: Reported on 10/13/2023), Disp: 26 tablet, Rfl: 0  Allergies   Allergen Reactions    Fentanyl Itching     Other reaction(s): VOMITING    Other reaction(s): Vomiting      Other reaction(s): VOMITING      Other reaction(s): emesis      Other Reaction(s): emesis, vomiting    Hydromorphone Rash     Other reaction(s): Vomiting      Other Reaction(s): vomiting    Morphine Itching     Other reaction(s): VOMITING    Other reaction(s): Vomiting      Other reaction(s): VOMITING      Other reaction(s): emesis      Other Reaction(s): emesis, vomiting       The following portions of the patient's history were reviewed and updated as appropriate: allergies, current medications, past family history, past medical history, past social history, past surgical history, and problem list.    Objective   Current Vitals:   Blood pressure 146/80, pulse 91, temperature 98.5 °F (36.9 °C), temperature source Temporal, resp. rate 18, height 5' 5\" (1.651 m), weight 110 kg (242 lb), SpO2 98%, not currently breastfeeding.    Physical Exam  Vitals and nursing note reviewed.   Constitutional:       General: She is not in acute distress.     Appearance: She is well-developed. She is not diaphoretic.   HENT:      Head: Normocephalic and atraumatic.   Eyes:      Pupils: Pupils are equal, round, and reactive to light.   Pulmonary:      Effort: No respiratory distress.   Abdominal:      Comments: Difficult abdominal exam due to prior reconstructive surgery. Midline scar well healed. Palpation suggests softness/weakness at level of recreated umbilicus, possible fat containing hernia noted with valsalva. Large curvilinear abdominoplasty scar without complication. TTP in LLQ without obvious bulge or hernia in ventral, " inguinal, or femoral regions. Mild to moderate TTP affecting left paraspinal soft tissue without visible/palpable abnormality.   Musculoskeletal:         General: Normal range of motion.      Cervical back: Normal range of motion.   Skin:     General: Skin is warm and dry.      Findings: No rash.   Neurological:      Mental Status: She is alert and oriented to person, place, and time.   Psychiatric:         Behavior: Behavior normal.         Signature:  Daryn Mcmahan PA-C  Date: 4/29/2024 Time: 1:05 PM

## 2024-04-23 NOTE — DISCHARGE INSTRUCTIONS
Immediately return to the emergency room if you experience any new or worsening symptoms or if the symptoms are lasting longer than expected.     Please follow-up with your OBGYN and general surgery.    CT ABDOMEN AND PELVIS WITH IV CONTRAST  INDICATION: LLQ pain & tenderness, hx of several abd surgeries.  COMPARISON: CT scan from 3/8/2023.  TECHNIQUE: CT examination of the abdomen and pelvis was performed. Multiplanar 2D reformatted images were created from the source data.  This examination, like all CT scans performed in the [facility], was performed utilizing techniques to minimize radiation dose exposure, including the use of iterative reconstruction and automated exposure control. Radiation dose length   product (DLP) for this visit: 917.59 mGy-cm  IV Contrast: 100 mL of iohexol (OMNIPAQUE)  Enteric Contrast: Not administered.  FINDINGS:  ABDOMEN  LOWER CHEST: No clinically significant abnormality in the visualized lower chest.  LIVER/BILIARY TREE: Unremarkable.  GALLBLADDER: Removed.  SPLEEN: Unremarkable.  PANCREAS: Unremarkable.  ADRENAL GLANDS: Unremarkable.  KIDNEYS/URETERS: No hydronephrosis or urinary tract calculi. Subcentimeter hypoattenuating renal lesion(s), too small to characterize but statistically likely benign, which do not warrant follow-up (Radiology June 2019).  STOMACH AND BOWEL: Unremarkable.  APPENDIX: Normal.  ABDOMINOPELVIC CAVITY: No ascites. No pneumoperitoneum. No lymphadenopathy.  VESSELS: Unremarkable for patient's age.  PELVIS  REPRODUCTIVE ORGANS: IUD in satisfactory position. Simple left ovarian cyst measuring 3.7 cm.  URINARY BLADDER: Unremarkable.  ABDOMINAL WALL/INGUINAL REGIONS: Previous abdominal wall hernia repair with intact mesh.  BONES: No acute fracture or suspicious osseous lesion.     IMPRESSION:  No acute inflammatory process.  Simple left ovarian cyst measuring 3.7 cm.

## 2024-04-24 ENCOUNTER — CONSULT (OUTPATIENT)
Dept: SURGERY | Facility: CLINIC | Age: 44
End: 2024-04-24
Payer: COMMERCIAL

## 2024-04-24 VITALS
TEMPERATURE: 98.5 F | WEIGHT: 242 LBS | HEART RATE: 91 BPM | RESPIRATION RATE: 18 BRPM | BODY MASS INDEX: 40.32 KG/M2 | HEIGHT: 65 IN | DIASTOLIC BLOOD PRESSURE: 80 MMHG | OXYGEN SATURATION: 98 % | SYSTOLIC BLOOD PRESSURE: 146 MMHG

## 2024-04-24 DIAGNOSIS — Z98.890 HISTORY OF INCISIONAL HERNIA REPAIR: ICD-10-CM

## 2024-04-24 DIAGNOSIS — R10.9 ABDOMINAL PAIN: ICD-10-CM

## 2024-04-24 DIAGNOSIS — Z87.19 HISTORY OF INCISIONAL HERNIA REPAIR: ICD-10-CM

## 2024-04-24 DIAGNOSIS — M54.10 RADICULOPATHY: Primary | ICD-10-CM

## 2024-04-24 DIAGNOSIS — M54.9 BACK PAIN: ICD-10-CM

## 2024-04-24 PROCEDURE — 99203 OFFICE O/P NEW LOW 30 MIN: CPT | Performed by: PHYSICIAN ASSISTANT

## 2024-04-24 NOTE — ASSESSMENT & PLAN NOTE
Pleasant 44-year-old female with complex surgical history including recurrent ventral hernia repair possibly with mesh as well as abdominoplasty in 2022 presenting for evaluation of sudden onset acute cramping left lower quadrant abdominal pain with radiation into her back starting 4 days ago and persisting.  Was evaluated in the ER on Monday where a CT suggested no acute inflammatory changes to explain her symptoms.  Blood work at the time reported to be normal.  No other significant associated symptoms.  On examination today she has a well-healed vertical midline and crescent-shaped lower abdominal scar.  No open wounds nor palpable seromas.  Potential weakness noted at the umbilicus, when examining this area she did report that yesterday she did notice protrusion in this area that she had to manually reduce that she was concerned about a hernia.  No bulging reported in the left or right groin.  On examination difficult to confidently identify a hernia at this location of her surgically created umbilicus.  No evidence for inguinal or femoral hernias.  Palpation of the low left back suggest no acute abnormality to explain her symptoms.  After full history and physical I suspect 2 different pathologies may be playing a role including potentially an occult recurrent incisional hernia of the midline that I would like to confirm with abdominal wall ultrasound where Valsalva can be used.  Additionally with her radiating back pain suggestive of a radiculopathy I would like to obtain thoraco lumbar x-rays to evaluate for degenerative spinal disease.  Additionally made recommendation for her to seek out follow-up with her existing [facility] plastic surgeon at [place] for second opinion regarding her symptoms.  Will ask her to return to the office following testing for evaluation by Dr. Spears for second opinion regarding potential recurrent incisional hernia.  Of note patient had been told that previous mesh was  removed however a CT scan suggesting mesh present and intact at this time.  Additionally we will try to request records of her prior surgery.  All questions answered to her understanding and satisfaction, agreeable to call or come in sooner if symptoms progress or change in any way, follow-up arranged.    Addendum 4/29: Op note reviewed. At time of abdominoplasty a supraumbilical hernia was noted with free floating mesh. Mesh/hernia appears to have been reduced (mesh not removed) and then defect closed with figure of eight suture of Ethibond.

## 2024-05-03 ENCOUNTER — TELEPHONE (OUTPATIENT)
Dept: SURGERY | Facility: CLINIC | Age: 44
End: 2024-05-03

## 2024-05-03 ENCOUNTER — HOSPITAL ENCOUNTER (OUTPATIENT)
Dept: RADIOLOGY | Facility: HOSPITAL | Age: 44
Discharge: HOME | End: 2024-05-03
Payer: COMMERCIAL

## 2024-05-03 ENCOUNTER — HOSPITAL ENCOUNTER (OUTPATIENT)
Dept: ULTRASOUND IMAGING | Facility: HOSPITAL | Age: 44
Discharge: HOME | End: 2024-05-03
Payer: COMMERCIAL

## 2024-05-03 DIAGNOSIS — Z87.19 HISTORY OF INCISIONAL HERNIA REPAIR: ICD-10-CM

## 2024-05-03 DIAGNOSIS — M54.10 RADICULOPATHY: ICD-10-CM

## 2024-05-03 DIAGNOSIS — Z98.890 HISTORY OF INCISIONAL HERNIA REPAIR: ICD-10-CM

## 2024-05-03 DIAGNOSIS — R10.9 ABDOMINAL PAIN: ICD-10-CM

## 2024-05-03 DIAGNOSIS — M54.9 BACK PAIN: ICD-10-CM

## 2024-05-03 PROCEDURE — 72080 X-RAY EXAM THORACOLMB 2/> VW: CPT

## 2024-05-03 PROCEDURE — 76705 ECHO EXAM OF ABDOMEN: CPT

## 2024-05-03 NOTE — PROGRESS NOTES
Assessment/Plan:    Blood per rectum  Patient has been referred to gastroenterology for consideration of colonoscopy in light of her recent history of blood per rectum.    Abdominal pain  Patient returns for routine scheduled follow-up status post testing regarding her chronic abdominal pain and bloating symptoms.    After thorough history, physical examination and review of the most recent CT imaging I have reassured the patient that her hernia repair appears intact.    We discussed potential etiologies of her symptom complex.    I have suggested that the potential benefits of any surgical intervention are outweighed by the potential harm that could come from iatrogenic injury incurred during surgery.    For this reason I have advocated for conservative nonsurgical approach to her symptom complex.    To further investigate her complaints I have advised that the patient follow-up with her plastic surgery for additional diagnostic and therapeutic treatment recommendations.    I look forward to seeing the patient back in 3 months time.  She has been encouraged to follow-up sooner on an as-needed basis.       Diagnoses and all orders for this visit:    Generalized abdominal pain  -     Ambulatory Referral to Plastic Surgery; Future    Blood per rectum  -     Ambulatory Referral to Gastroenterology; Future          Subjective:      Patient ID: Radha Velarde is a 44 y.o. female.    Patient is here for a f/u after x ray and US for the LLQ sharp/aching pain and tenderness that radiates to her lower back that she has been dealing with since 2022. Patient states after her US on 05/03/24 she suffered severe pain, swelling of the abd, and had a bright red bloody diarrhea, and has also been suffering from diarrhea Patient denies nausea/vomiting, constipation, issues with urination, trouble eating/drinking/swallowing, or fevers/chills. Lei.ROSALIA ARTEAGA            Review of Systems   Constitutional:  Negative for chills and fever.  "  HENT:  Negative for ear pain and sore throat.    Eyes:  Negative for pain and visual disturbance.   Respiratory:  Negative for cough and shortness of breath.    Cardiovascular:  Negative for chest pain and palpitations.   Gastrointestinal:  Negative for abdominal pain and vomiting.   Genitourinary:  Negative for dysuria and hematuria.   Musculoskeletal:  Negative for arthralgias and back pain.   Skin:  Negative for color change and rash.   Neurological:  Negative for seizures and syncope.   All other systems reviewed and are negative.        Objective:      /90 (BP Location: Left arm, Patient Position: Sitting, Cuff Size: Large)   Pulse 89   Temp 98.8 °F (37.1 °C) (Temporal)   Resp 18   Ht 5' 5\" (1.651 m)   Wt 108 kg (238 lb)   SpO2 99%   BMI 39.61 kg/m²          Physical Exam  Constitutional:       Appearance: She is well-developed.   HENT:      Head: Normocephalic and atraumatic.   Eyes:      Conjunctiva/sclera: Conjunctivae normal.      Pupils: Pupils are equal, round, and reactive to light.   Cardiovascular:      Rate and Rhythm: Normal rate and regular rhythm.   Pulmonary:      Effort: Pulmonary effort is normal.      Breath sounds: Normal breath sounds.   Abdominal:      General: Bowel sounds are normal.      Palpations: Abdomen is soft.      Comments: No obvious signs of recurrent ventral hernia on today's examination in the supine and upright positions.   Musculoskeletal:         General: Normal range of motion.      Cervical back: Normal range of motion and neck supple.   Skin:     General: Skin is warm and dry.   Neurological:      Mental Status: She is alert and oriented to person, place, and time.   Psychiatric:         Behavior: Behavior normal.         Thought Content: Thought content normal.         Judgment: Judgment normal.           "

## 2024-05-03 NOTE — TELEPHONE ENCOUNTER
Called pt and left message to reschedule her Monday's appt because she did her testing today and it usually takes about a week to get the results back.

## 2024-05-03 NOTE — TELEPHONE ENCOUNTER
Patient called back requesting her appointment not to be reschedule. Called the office talked to Hope mariano can keep her appointment 05/06/2024 at 9:30 am

## 2024-05-06 ENCOUNTER — OFFICE VISIT (OUTPATIENT)
Dept: SURGERY | Facility: CLINIC | Age: 44
End: 2024-05-06
Payer: COMMERCIAL

## 2024-05-06 VITALS
SYSTOLIC BLOOD PRESSURE: 140 MMHG | HEIGHT: 65 IN | OXYGEN SATURATION: 99 % | RESPIRATION RATE: 18 BRPM | HEART RATE: 89 BPM | TEMPERATURE: 98.8 F | DIASTOLIC BLOOD PRESSURE: 90 MMHG | WEIGHT: 238 LBS | BODY MASS INDEX: 39.65 KG/M2

## 2024-05-06 DIAGNOSIS — K62.5 BLOOD PER RECTUM: ICD-10-CM

## 2024-05-06 DIAGNOSIS — R10.84 GENERALIZED ABDOMINAL PAIN: Primary | ICD-10-CM

## 2024-05-06 PROCEDURE — 99213 OFFICE O/P EST LOW 20 MIN: CPT | Performed by: SURGERY

## 2024-05-06 NOTE — ASSESSMENT & PLAN NOTE
Patient returns for routine scheduled follow-up status post testing regarding her chronic abdominal pain and bloating symptoms.    After thorough history, physical examination and review of the most recent CT imaging I have reassured the patient that her hernia repair appears intact.    We discussed potential etiologies of her symptom complex.    I have suggested that the potential benefits of any surgical intervention are outweighed by the potential harm that could come from iatrogenic injury incurred during surgery.    For this reason I have advocated for conservative nonsurgical approach to her symptom complex.    To further investigate her complaints I have advised that the patient follow-up with her plastic surgery for additional diagnostic and therapeutic treatment recommendations.    I look forward to seeing the patient back in 3 months time.  She has been encouraged to follow-up sooner on an as-needed basis.

## 2024-05-16 ENCOUNTER — OFFICE VISIT (OUTPATIENT)
Dept: URGENT CARE | Facility: CLINIC | Age: 44
End: 2024-05-16
Payer: COMMERCIAL

## 2024-05-16 VITALS
OXYGEN SATURATION: 98 % | SYSTOLIC BLOOD PRESSURE: 171 MMHG | DIASTOLIC BLOOD PRESSURE: 89 MMHG | RESPIRATION RATE: 18 BRPM | TEMPERATURE: 98.3 F | HEART RATE: 88 BPM

## 2024-05-16 DIAGNOSIS — B96.89 ACUTE BACTERIAL BRONCHITIS: Primary | ICD-10-CM

## 2024-05-16 DIAGNOSIS — J20.8 ACUTE BACTERIAL BRONCHITIS: Primary | ICD-10-CM

## 2024-05-16 PROCEDURE — 99213 OFFICE O/P EST LOW 20 MIN: CPT | Performed by: FAMILY MEDICINE

## 2024-05-16 RX ORDER — AZITHROMYCIN 200 MG/5ML
POWDER, FOR SUSPENSION ORAL
Qty: 37.7 ML | Refills: 0 | Status: SHIPPED | OUTPATIENT
Start: 2024-05-16 | End: 2024-05-21

## 2024-05-16 NOTE — PROGRESS NOTES
[facility]        NAME: Radha Velarde is a 44 y.o. female  : 1980    MRN: [id number]  DATE: May 16, 2024  TIME: 1:14 PM    Assessment and Plan   Acute bacterial bronchitis [J20.8, B96.89]  1. Acute bacterial bronchitis  azithromycin (ZITHROMAX) 200 mg/5 mL suspension            Patient Instructions     Continue Mucinex and Delsym for cough.  Follow up with PCP in 3-5 days.  Proceed to  ER if symptoms worsen.    If tests have been performed at [facility], our office will contact you with results if changes need to be made to the care plan discussed with you at the visit.  You can review your full results on [facility].    Chief Complaint     Chief Complaint   Patient presents with   • Cold Like Symptoms     Cough, chest congestion x 1 week. Yellow mucous, pain in upper back from coughing?         History of Present Illness       Cold Like Symptoms (Cough, chest congestion x 1 week. Yellow mucous, pain in upper back from coughing?)  Patient is tried Mucinex, Mucinex syrup, Delsym, and Tessalon Perles.  However she is not taking any of them in combination.  Patient is having mid upper back pain due to the cough.    URI   This is a new problem. The current episode started in the past 7 days. The problem has been gradually worsening. Associated symptoms include congestion and coughing.       Review of Systems   Review of Systems   Constitutional: Negative.    HENT:  Positive for congestion.    Respiratory:  Positive for cough.          Current Medications       Current Outpatient Medications:   •  azithromycin (ZITHROMAX) 200 mg/5 mL suspension, Take 12.5 mL (500 mg total) by mouth daily for 1 day, THEN 6.3 mL (250 mg total) daily for 4 days., Disp: 37.7 mL, Rfl: 0  •  levonorgestrel (Mirena, 52 MG,) 20 MCG/24HR IUD, , Disp: , Rfl:   •  Nurtec 75 MG TBDP, DISSOLVE 1 TABLET BY MOUTH ONE TIME AS NEEDED FOR MIGRAINE, Disp: , Rfl:   •  acetaminophen (TYLENOL) 500 mg tablet, Take 500 mg by mouth,  Disp: , Rfl:   •  albuterol (Ventolin HFA) 90 mcg/act inhaler, Inhale 2 puffs every 4 (four) hours as needed for wheezing or shortness of breath (Patient not taking: Reported on 10/13/2023), Disp: 18 g, Rfl: 0  •  dicyclomine (BENTYL) 20 mg tablet, Take 1 tablet (20 mg total) by mouth 2 (two) times a day (Patient not taking: Reported on 10/13/2023), Disp: 20 tablet, Rfl: 0  •  hydroCHLOROthiazide 25 mg tablet, Take 25 mg by mouth daily (Patient not taking: Reported on 5/6/2024), Disp: , Rfl:   •  ketorolac (TORADOL) 10 mg tablet, Take 1 tablet (10 mg total) by mouth every 6 (six) hours as needed for moderate pain (Patient not taking: Reported on 10/13/2023), Disp: 10 tablet, Rfl: 0  •  megestrol (MEGACE) 40 mg tablet, Take 40 mg by mouth 2 (two) times a day (Patient not taking: Reported on 5/6/2024), Disp: , Rfl:   •  predniSONE 10 mg tablet, Take 3 tabs BID X 2 days, 2 tabs BID X 2 days, 1 tab BID X 2 days, 1 tab daily X 2 days (Patient not taking: Reported on 10/13/2023), Disp: 26 tablet, Rfl: 0    Current Allergies     Allergies as of 05/16/2024 - Reviewed 05/16/2024   Allergen Reaction Noted   • Fentanyl Itching 11/17/2020   • Hydromorphone Rash 11/19/2020   • Morphine Itching 11/17/2020            The following portions of the patient's history were reviewed and updated as appropriate: allergies, current medications, past family history, past medical history, past social history, past surgical history and problem list.     Past Medical History:   Diagnosis Date   • COVID     diagnoses 12/23/2021   • Migraine    • Tendonitis     right arm       Past Surgical History:   Procedure Laterality Date   • ABDOMINAL SURGERY     • ABDOMINOPLASTY N/A 3/1/2022    Procedure: CIRCUMFERENTIAL WITH REPAIR OF RECURRENT INCISIONAL HERNIA;  Surgeon: Paramjit Adrian MD;  Location:  MAIN OR;  Service: Plastics   • ADENOIDECTOMY     • CHOLECYSTECTOMY     • CYSTOSCOPY     • GASTROPLASTY     • HERNIA REPAIR     • PANNICULECTOMY N/A  3/1/2022    Procedure: LIPECTOMY;  Surgeon: Paramjit Adrian MD;  Location:  MAIN OR;  Service: Plastics   • TONSILLECTOMY     • WISDOM TOOTH EXTRACTION         History reviewed. No pertinent family history.      Medications have been verified.        Objective   BP (!) 171/89   Pulse 88   Temp 98.3 °F (36.8 °C)   Resp 18   SpO2 98%   No LMP recorded. Patient has had an implant.       Physical Exam     Physical Exam  Vitals and nursing note reviewed.   Constitutional:       General: She is not in acute distress.     Appearance: Normal appearance. She is well-developed.   HENT:      Right Ear: Tympanic membrane and external ear normal.      Left Ear: Tympanic membrane and external ear normal.      Nose: Nose normal.      Mouth/Throat:      Pharynx: No oropharyngeal exudate.   Eyes:      Conjunctiva/sclera: Conjunctivae normal.   Cardiovascular:      Rate and Rhythm: Normal rate and regular rhythm.      Heart sounds: Normal heart sounds. No murmur heard.  Pulmonary:      Effort: Pulmonary effort is normal. No respiratory distress.      Breath sounds: Examination of the right-lower field reveals rhonchi. Examination of the left-lower field reveals rhonchi. Rhonchi present. No wheezing or rales.   Chest:      Chest wall: No tenderness.   Musculoskeletal:         General: Normal range of motion.      Cervical back: Normal range of motion and neck supple.   Lymphadenopathy:      Cervical: No cervical adenopathy.   Skin:     General: Skin is warm.      Findings: No erythema or rash.   Neurological:      Mental Status: She is alert and oriented to person, place, and time.

## 2024-05-16 NOTE — LETTER
May 16, 2024     Patient: Radha Velarde   YOB: 1980   Date of Visit: 5/16/2024       To Whom it May Concern:    Radha Velarde was seen in my clinic on 5/16/2024.     If you have any questions or concerns, please don't hesitate to call.         Sincerely,          Bladimir Russell, DO        CC: No Recipients

## 2024-05-16 NOTE — PATIENT INSTRUCTIONS
Continue Mucinex and Delsym for cough.  Follow up with PCP in 3-5 days.  Proceed to  ER if symptoms worsen.    If tests have been performed at [facility], our office will contact you with results if changes need to be made to the care plan discussed with you at the visit.  You can review your full results on [facility] MyChart.Acute Bronchitis   AMBULATORY CARE:   Acute bronchitis  is swelling and irritation in your lungs. It is usually caused by a virus and most often happens in the winter. Bronchitis may also be caused by bacteria or by a chemical irritant, such as smoke.  Common symptoms:   Cough that lasts up to 3 weeks    Runny or stuffy nose    Hoarseness, sore throat    Fever    Feeling more tired than usual, and body aches    Wheezing or pain when you breathe or cough    Seek care immediately if:   You cough up blood.    Your lips or fingernails turn blue.    You feel like you are not getting enough air when you breathe.    Call your doctor if:   Your symptoms do not go away or get worse, even after treatment.    Your cough does not get better within 4 weeks.    You have questions or concerns about your condition or care.    Medicines:  You may need any of the following:  Cough suppressants  decrease your urge to cough.    Decongestants  help loosen mucus in your lungs and make it easier to cough up. This can help you breathe easier.    Inhalers  may be given. Your healthcare provider may give you one or more inhalers to help you breathe easier and cough less. An inhaler gives you medicine to open your airways. Ask your healthcare provider to show you how to use your inhaler correctly.         Antiviral medicine  treats infections caused by a virus.    Antibiotics  may be given if your bronchitis is caused by bacteria or if you have lung condition.    Acetaminophen  decreases pain and fever. It is available without a doctor's order. Ask how much to take and how often to take it. Follow directions. Read the  labels of all other medicines you are using to see if they also contain acetaminophen, or ask your doctor or pharmacist. Acetaminophen can cause liver damage if not taken correctly.    NSAIDs  help decrease swelling and pain or fever. This medicine is available with or without a doctor's order. NSAIDs can cause stomach bleeding or kidney problems in certain people. If you take blood thinner medicine, always ask your healthcare provider if NSAIDs are safe for you. Always read the medicine label and follow directions.    Self-care:   Drink liquids as directed.  You may need to drink more liquids than usual to stay hydrated. Ask how much liquid to drink each day and which liquids are best for you.    Use a cool mist humidifier.  This increases air moisture in your home. This may make it easier for you to breathe and help decrease your cough.     Get more rest.  Rest helps your body to heal. Slowly start to do more each day. Rest when you feel it is needed.    Prevent acute bronchitis:       Ask about vaccines you may need.  Get a flu vaccine each year as soon as recommended, usually in September or October. Ask your healthcare provider if you should also get a pneumonia or COVID-19 vaccine. Your healthcare provider can tell you if you should also get other vaccines, and when to get them.    Prevent the spread of germs.  You can decrease your risk for acute bronchitis and other illnesses by doing the following:    Wash your hands often with soap and water. Carry germ-killing hand lotion or gel with you. You can use the lotion or gel to clean your hands when soap and water are not available.         Do not touch your eyes, nose, or mouth unless you have washed your hands first.    Always cover your mouth when you cough to prevent the spread of germs. It is best to cough into a tissue or your shirt sleeve instead of into your hand. Ask those around you to cover their mouths when they cough.    Try to avoid people who have  a cold or the flu. If you are sick, stay away from others as much as possible.    Avoid irritants in the air.  Avoid chemicals, fumes, and dust. Wear a face mask if you must work around dust or fumes. Stay inside on days when air pollution levels are high. If you have allergies, stay inside when pollen counts are high. Do not use aerosol products, such as spray-on deodorant, bug spray, and hair spray.    Do not smoke or be around others who are smoking.  Nicotine and other chemicals in cigarettes and cigars can cause lung damage. Ask your healthcare provider for information if you currently smoke and need help to quit. E-cigarettes or smokeless tobacco still contain nicotine. Talk to your healthcare provider before you use these products.  Follow up with your doctor as directed:  Write down questions you have so you will remember to ask them during your follow-up visits.  © Copyright [employer] 2023 Information is for End User's use only and may not be sold, redistributed or otherwise used for commercial purposes.  The above information is an  only. It is not intended as medical advice for individual conditions or treatments. Talk to your doctor, nurse or pharmacist before following any medical regimen to see if it is safe and effective for you.

## 2024-08-16 NOTE — ASSESSMENT & PLAN NOTE
Patient has been referred to gastroenterology for consideration of colonoscopy in light of her recent history of blood per rectum.   [FreeTextEntry1] : Jacquie Moore is a 31 year old female with PMH of MDD, BPD, anxiety/depression, HTN (recently diagnosed by PCP - noncompliant with medication), cervical stenosis with L sided tingling since 5/2024 who episode of left face, arm and leg tingling in 3/2024 presents for neurological follow up.  Since last visit, patient completed MRA H/N and MRI head w/wo without abnormalities, normal TCDs and carotid US and negative hypercoagulable work up.   Medications: Symptoms: Migraines:

## 2025-03-12 ENCOUNTER — OFFICE VISIT (OUTPATIENT)
Dept: URGENT CARE | Facility: CLINIC | Age: 45
End: 2025-03-12
Payer: COMMERCIAL

## 2025-03-12 VITALS
DIASTOLIC BLOOD PRESSURE: 88 MMHG | TEMPERATURE: 98.4 F | SYSTOLIC BLOOD PRESSURE: 132 MMHG | HEART RATE: 84 BPM | RESPIRATION RATE: 18 BRPM | OXYGEN SATURATION: 98 %

## 2025-03-12 DIAGNOSIS — J06.9 ACUTE URI: Primary | ICD-10-CM

## 2025-03-12 PROCEDURE — 99213 OFFICE O/P EST LOW 20 MIN: CPT

## 2025-03-12 RX ORDER — FLUTICASONE PROPIONATE 50 MCG
1 SPRAY, SUSPENSION (ML) NASAL DAILY
Qty: 9.9 ML | Refills: 0 | Status: SHIPPED | OUTPATIENT
Start: 2025-03-12

## 2025-03-12 NOTE — LETTER
March 12, 2025     Patient: Radha Velarde   YOB: 1980   Date of Visit: 3/12/2025       To Whom it May Concern:    Radha Velarde was seen in my clinic on 3/12/2025. She may return to work on 3/14/2025 .    If you have any questions or concerns, please don't hesitate to call.         Sincerely,          Andi Bueno PA-C        CC: No Recipients

## 2025-03-12 NOTE — PATIENT INSTRUCTIONS
Take flonase as directed.  Continue OTC Cough syrup at home.  Monitor for worsening of symptoms over next 5 days.

## 2025-03-12 NOTE — LETTER
March 12, 2025     Patient: Radha Velarde   YOB: 1980   Date of Visit: 3/12/2025       To Whom it May Concern:    Radha Velarde was seen in my clinic on 3/12/2025. She may return to work on 3/17/2025 .    If you have any questions or concerns, please don't hesitate to call.         Sincerely,          Andi Bueno PA-C        CC: No Recipients

## 2025-03-12 NOTE — PROGRESS NOTES
[facility]        NAME: Radha Velarde is a 45 y.o. female  : 1980    MRN: [id number]  DATE: 2025  TIME: 12:08 PM    Assessment and Plan   Acute URI [J06.9]  1. Acute URI          Discussed with patient that symptoms and timeline are suggestive of a viral infection.  Patient unable to take many over-the-counter medications due to hypertension.  She does not feel comfortable taking capsule medications at this point due to gastroplasty and recent EGD with biopsies.  Will trial Flonase for sinusitis/ear symptoms and will monitor for symptom progression or worsening over the next 5 days.    Patient Instructions   Take flonase as directed.  Continue OTC Cough syrup at home.  Monitor for worsening of symptoms over next 5 days.    Follow up with PCP in 3-5 days.  Proceed to  ER if symptoms worsen.    If tests have been performed at [facility], our office will contact you with results if changes need to be made to the care plan discussed with you at the visit.  You can review your full results on [facility].    Chief Complaint     Chief Complaint   Patient presents with    Flu Symptoms     Head, nasal congestion chest congestion cough nausea and diarrhea 3 days          History of Present Illness       45-year-old female with PMH hypertension, gastroplasty presenting with 5 days of sinus pressure, bilateral ear pain, productive cough, nausea, diarrhea.  She notes that her nasal drainage has went from clear to yellow and was worried about a bacterial infection.  She has associated headaches and bodyaches which have made her stay home from work the past 3 days.  She is denying fevers, chest pain, shortness of breath, wheezing, abdominal pain, vomiting.  1 week ago she underwent EGD with multiple biopsies taken.  She notes she has a vertical band placed on her stomach which requires her to take only very small oral medications or liquid medications.  She has been using Delsym cough syrup at  night and Tylenol severe sinus without relief of her symptoms.    Flu Symptoms  Associated symptoms include congestion, ear pain, headaches, rhinorrhea, coughing, diarrhea and nausea. Pertinent negatives include no eye pain, sore throat, fever, chest pain, shortness of breath, abdominal pain, vomiting or rash.       Review of Systems   Review of Systems   Constitutional:  Negative for chills and fever.   HENT:  Positive for congestion, ear pain, rhinorrhea and sinus pressure. Negative for sinus pain and sore throat.    Eyes:  Negative for pain and visual disturbance.   Respiratory:  Positive for cough. Negative for shortness of breath.    Cardiovascular:  Negative for chest pain and palpitations.   Gastrointestinal:  Positive for diarrhea and nausea. Negative for abdominal pain and vomiting.   Genitourinary:  Negative for dysuria and hematuria.   Musculoskeletal:  Positive for myalgias. Negative for arthralgias and back pain.   Skin:  Negative for color change and rash.   Neurological:  Positive for headaches. Negative for seizures and syncope.   All other systems reviewed and are negative.        Current Medications       Current Outpatient Medications:     levonorgestrel (Mirena, 52 MG,) 20 MCG/24HR IUD, , Disp: , Rfl:     Nurtec 75 MG TBDP, DISSOLVE 1 TABLET BY MOUTH ONE TIME AS NEEDED FOR MIGRAINE, Disp: , Rfl:     acetaminophen (TYLENOL) 500 mg tablet, Take 500 mg by mouth (Patient not taking: Reported on 3/12/2025), Disp: , Rfl:     albuterol (Ventolin HFA) 90 mcg/act inhaler, Inhale 2 puffs every 4 (four) hours as needed for wheezing or shortness of breath (Patient not taking: Reported on 10/13/2023), Disp: 18 g, Rfl: 0    dicyclomine (BENTYL) 20 mg tablet, Take 1 tablet (20 mg total) by mouth 2 (two) times a day (Patient not taking: Reported on 10/13/2023), Disp: 20 tablet, Rfl: 0    hydroCHLOROthiazide 25 mg tablet, Take 25 mg by mouth daily (Patient not taking: Reported on 5/6/2024), Disp: , Rfl:      ketorolac (TORADOL) 10 mg tablet, Take 1 tablet (10 mg total) by mouth every 6 (six) hours as needed for moderate pain (Patient not taking: Reported on 10/13/2023), Disp: 10 tablet, Rfl: 0    megestrol (MEGACE) 40 mg tablet, Take 40 mg by mouth 2 (two) times a day (Patient not taking: Reported on 5/6/2024), Disp: , Rfl:     predniSONE 10 mg tablet, Take 3 tabs BID X 2 days, 2 tabs BID X 2 days, 1 tab BID X 2 days, 1 tab daily X 2 days (Patient not taking: Reported on 10/13/2023), Disp: 26 tablet, Rfl: 0    Current Allergies     Allergies as of 03/12/2025 - Reviewed 03/12/2025   Allergen Reaction Noted    Fentanyl Itching 11/17/2020    Hydromorphone Rash 11/19/2020    Morphine Itching 11/17/2020            The following portions of the patient's history were reviewed and updated as appropriate: allergies, current medications, past family history, past medical history, past social history, past surgical history and problem list.     Past Medical History:   Diagnosis Date    COVID     diagnoses 12/23/2021    Migraine     Tendonitis     right arm       Past Surgical History:   Procedure Laterality Date    ABDOMINAL SURGERY      ABDOMINOPLASTY N/A 3/1/2022    Procedure: CIRCUMFERENTIAL WITH REPAIR OF RECURRENT INCISIONAL HERNIA;  Surgeon: Paramjit Adrian MD;  Location: [facility];  Service: Plastics    ADENOIDECTOMY      CHOLECYSTECTOMY      CYSTOSCOPY      GASTROPLASTY      HERNIA REPAIR      PANNICULECTOMY N/A 3/1/2022    Procedure: LIPECTOMY;  Surgeon: Paramjit Adrian MD;  Location:  MAIN OR;  Service: Plastics    TONSILLECTOMY      WISDOM TOOTH EXTRACTION         No family history on file.      Medications have been verified.        Objective   /88   Pulse 84   Temp 98.4 °F (36.9 °C)   Resp 18   SpO2 98%   No LMP recorded.       Physical Exam     Physical Exam  Constitutional:       General: She is not in acute distress.     Appearance: Normal appearance. She is not ill-appearing.   HENT:      Head:  Normocephalic and atraumatic.      Right Ear: Ear canal and external ear normal.      Left Ear: Ear canal and external ear normal.      Nose: Rhinorrhea present.      Mouth/Throat:      Mouth: Mucous membranes are moist.      Pharynx: No oropharyngeal exudate or posterior oropharyngeal erythema.   Eyes:      Conjunctiva/sclera: Conjunctivae normal.      Pupils: Pupils are equal, round, and reactive to light.   Cardiovascular:      Rate and Rhythm: Normal rate and regular rhythm.      Pulses: Normal pulses.      Heart sounds: Normal heart sounds.   Pulmonary:      Effort: Pulmonary effort is normal. No respiratory distress.      Breath sounds: Normal breath sounds. No wheezing or rhonchi.   Abdominal:      General: Abdomen is flat.      Palpations: Abdomen is soft.   Musculoskeletal:      Cervical back: Neck supple.   Lymphadenopathy:      Cervical: No cervical adenopathy.   Skin:     General: Skin is warm and dry.      Capillary Refill: Capillary refill takes less than 2 seconds.   Neurological:      General: No focal deficit present.      Mental Status: She is alert and oriented to person, place, and time.

## (undated) DEVICE — COTTON TIP APPLICTOR 2 PK

## (undated) DEVICE — INTENDED FOR TISSUE SEPARATION, AND OTHER PROCEDURES THAT REQUIRE A SHARP SURGICAL BLADE TO PUNCTURE OR CUT.: Brand: BARD-PARKER ® SAFETYLOCK CARBON RIB-BACK BLADES

## (undated) DEVICE — TUBING SUCTION 5MM X 12 FT

## (undated) DEVICE — STANDARD SURGICAL GOWN, L: Brand: CONVERTORS

## (undated) DEVICE — NEEDLE FILTER 5 MICR 19G X 1.5IN

## (undated) DEVICE — NEEDLE 25G X 1 1/2

## (undated) DEVICE — DRESSING XEROFORM 5 X 9

## (undated) DEVICE — STERILE POLYISOPRENE POWDER-FREE SURGICAL GLOVES WITH EMOLLIENT COATING: Brand: PROTEXIS

## (undated) DEVICE — DISPOSABLE OR TOWEL: Brand: CARDINAL HEALTH

## (undated) DEVICE — SYRINGE 10ML LL CONTROL TOP

## (undated) DEVICE — SUT ETHIBOND 0 CT-2 30 IN X412H

## (undated) DEVICE — SUT VICRYL 2-0 J459H

## (undated) DEVICE — TUBING INFILTRATION 9FT

## (undated) DEVICE — PACK UNIVERSAL DRAPES SUB-Q ICD

## (undated) DEVICE — WET SKIN PREP TRAY: Brand: MEDLINE INDUSTRIES, INC.

## (undated) DEVICE — JACKSON-PRATT 100CC BULB RESERVOIR: Brand: CARDINAL HEALTH

## (undated) DEVICE — SUT ETHILON 3-0 PS-1 18 IN 1663H

## (undated) DEVICE — TAPE TRANSPORE CLEAR 2 IN

## (undated) DEVICE — STERILE POLYISOPRENE POWDER-FREE SURGICAL GLOVES: Brand: PROTEXIS

## (undated) DEVICE — SPONGE LAP 18 X 18 IN STRL RFD

## (undated) DEVICE — 1820 FOAM BLOCK NEEDLE COUNTER: Brand: DEVON

## (undated) DEVICE — INTENDED FOR TISSUE SEPARATION, AND OTHER PROCEDURES THAT REQUIRE A SHARP SURGICAL BLADE TO PUNCTURE OR CUT.: Brand: BARD-PARKER SAFETY BLADES SIZE 11, STERILE

## (undated) DEVICE — CANNISTER WASTE IMPLOSION PROOF

## (undated) DEVICE — CANNULA 4MM DBL MERCEDES SINGLE USE 30CM 10MM PORT

## (undated) DEVICE — SKIN MARKER DUAL TIP WITH RULER CAP, FLEXIBLE RULER AND LABELS: Brand: DEVON

## (undated) DEVICE — SUT ETHILON 5-0 PS-2 18 IN 1666H

## (undated) DEVICE — DRAPE TOWEL: Brand: CONVERTORS

## (undated) DEVICE — CRADLE EXTREMITY UNIVERSAL CONTOURED

## (undated) DEVICE — SUT PROLENE 3-0 PC-5 18 IN 8632G

## (undated) DEVICE — 3M™ STERI-STRIP™ REINFORCED ADHESIVE SKIN CLOSURES, R1547, 1/2 IN X 4 IN (12 MM X 100 MM), 6 STRIPS/ENVELOPE: Brand: 3M™ STERI-STRIP™

## (undated) DEVICE — DRAIN CHANNEL RND FULL FLUTED 19FR W/TROCAR

## (undated) DEVICE — DRAPE SHEET THREE QUARTER

## (undated) DEVICE — SINGLE PORT MANIFOLD: Brand: NEPTUNE 2

## (undated) DEVICE — SYRINGE 3ML LL

## (undated) DEVICE — KERLIX BANDAGE ROLL: Brand: KERLIX

## (undated) DEVICE — SUT VICRYL 4-0 PS-2 18 IN J496G

## (undated) DEVICE — SCD SEQUENTIAL COMPRESSION COMFORT SLEEVE MEDIUM KNEE LENGTH: Brand: KENDALL SCD

## (undated) DEVICE — TUBING ASPIRATION LIPOSUCTION SET 12FT

## (undated) DEVICE — TRAY FOLEY 16FR URIMETER SURESTEP

## (undated) DEVICE — ELECTRODE EZ CLEAN BLADE -0012

## (undated) DEVICE — PENCIL SMOKE EVAC TELESCOPING W/TUBING